# Patient Record
Sex: MALE | Race: BLACK OR AFRICAN AMERICAN | Employment: FULL TIME | URBAN - METROPOLITAN AREA
[De-identification: names, ages, dates, MRNs, and addresses within clinical notes are randomized per-mention and may not be internally consistent; named-entity substitution may affect disease eponyms.]

---

## 2022-09-07 RX ORDER — DEXAMETHASONE 4 MG/1
4 TABLET ORAL EVERY 6 HOURS
Status: ON HOLD | COMMUNITY
Start: 2022-09-01 | End: 2022-09-16 | Stop reason: HOSPADM

## 2022-09-07 RX ORDER — FEXOFENADINE HCL 180 MG/1
180 TABLET ORAL DAILY
Status: ON HOLD | COMMUNITY
End: 2022-09-14 | Stop reason: CLARIF

## 2022-09-07 RX ORDER — LATANOPROST 50 UG/ML
1 SOLUTION/ DROPS OPHTHALMIC NIGHTLY
COMMUNITY

## 2022-09-07 RX ORDER — ECHINACEA PURPUREA EXTRACT 125 MG
1 TABLET ORAL 2 TIMES DAILY
COMMUNITY

## 2022-09-07 RX ORDER — FERROUS SULFATE 325(65) MG
325 TABLET ORAL
COMMUNITY
Start: 2022-06-27

## 2022-09-07 RX ORDER — FAMOTIDINE 20 MG/1
20 TABLET, FILM COATED ORAL 2 TIMES DAILY
COMMUNITY
Start: 2022-09-01

## 2022-09-07 RX ORDER — AZELASTINE HYDROCHLORIDE, FLUTICASONE PROPIONATE 137; 50 UG/1; UG/1
1 SPRAY, METERED NASAL 2 TIMES DAILY
COMMUNITY

## 2022-09-07 RX ORDER — ACETAMINOPHEN 500 MG
1000 TABLET ORAL DAILY
COMMUNITY

## 2022-09-07 RX ORDER — HYDROCORTISONE 25 MG/ML
LOTION TOPICAL
Status: ON HOLD | COMMUNITY
Start: 2019-10-30 | End: 2022-09-14 | Stop reason: CLARIF

## 2022-09-07 RX ORDER — TRAVOPROST OPHTHALMIC SOLUTION 0.04 MG/ML
SOLUTION OPHTHALMIC
Status: ON HOLD | COMMUNITY
Start: 2009-10-07 | End: 2022-09-14 | Stop reason: CLARIF

## 2022-09-07 RX ORDER — CETIRIZINE HYDROCHLORIDE 10 MG/1
10 TABLET ORAL DAILY
COMMUNITY
Start: 2020-06-10

## 2022-09-07 RX ORDER — ERGOCALCIFEROL (VITAMIN D2) 1250 MCG
50000 CAPSULE ORAL WEEKLY
COMMUNITY

## 2022-09-07 NOTE — PROGRESS NOTES
PRE-OP INSTRUCTIONS FOR SURGICAL PATIENTS          Our Pre-admission Testing Nurses tried and were unable to reach you today. Please read the attached instructions if you did not listen to your voicemail. Follow all instructions provided to you from your surgeon's office, including your ARRIVAL TIME. Arrange for someone to drive you home and be with you for the first 24 hours after discharge. NOTE: at this time ONLY 2 ADULTS may accompany you and everyone must be masked. Enter the MAIN entrance located on [address] and report to the surgical desk on the LEFT side of the lobby. Please park in the parking garage or there is free MAD Incubator available after 7am for your use. Bring your insurance card & photo ID with you to register. Bring your medication list with you with dose and frequency listed (including over the counter medications)  Contact your ordering physician/surgeon for medication instructions as soon as possible, especially if taking blood thinners, aspirin, heart, or diabetic medication. Bariatric surgical patients need to call your surgeon if on diabetic medications (as some may need to be stopped 1-week preop)  A Pre-Surgical History and Physical MUST be completed WITHIN 30 DAYS OR LESS prior to your procedure by your Physician or an Urgent Care. DO NOT EAT ANYTHING 8 hours prior to arrival for surgery. You may have sips of WATER ONLY (up to 8 ounces) 4 hours prior to your arrival for surgery. Then nothing further 4 hours prior to arriving at hospital.   NOTE: ALL Gastric, Bariatric & Bowel surgery patients - you MUST follow your surgeon's instructions regarding eating/drinking as you will have very specific instructions to follow. If you did not receive these, call your surgeon's office immediately. No gum, candy, mints, or ice chips day of procedure. Please refrain from drinking alcohol the day before or day of your procedure.    Do not use any recreational marijuana at least 24 hours or street drugs (heroin, cocaine) at minimum 5 days prior to your procedure. Please do not smoke the day of your procedure. Dress in loose, comfortable clothing appropriate for redressing after your procedure. Do not wear jewelry (including body piercings), make-up, fingernail polish, lotion, powders, or metal hairclips. Contacts, glasses, dentures, and hearing aids will need to be removed prior to surgery. Bring your case(s) to protect them while you are in surgery. If you use a CPAP, please bring it with you on the day of your procedure. Do not shave or wax for 72 hours prior to procedure near your operative site. Leave all other valuables at home. IF there is a change in your physical condition for some reason, such as: a cold, fever, rash, cuts, sores, or any other infection, especially near your surgical site, contact your surgeon's office immediately. FOR WOMAN OF CHILDBEARING AGE ONLY- please make sure we can collect a urine sample upon arrival.     Instructions left on patient's voicemail.   Todd Davies RN.9/7/2022 .8:22 AM

## 2022-09-07 NOTE — PROGRESS NOTES
Place patient label inside box (if no patient label, complete below)  Name:  :  MR#:     Reji Winslow / PROCEDURE  I (we) Candace Arnold (Patient Name) authorize Crispin Butterfield MD (Provider / [facility]) and/or such assistants as may be selected by him/her, to perform the following operation/procedure(s): LEFT TEMPORAL CRANIOTOMY FOR RESECTION OF TUMOR WITH IMAGE GUIDANCE       Note: If unable to obtain consent prior to an emergent procedure, document the emergent reason in the medical record. This procedure has been explained to my (our) satisfaction and included in the explanation was: The intended benefit, nature, and extent of the procedure to be performed; The significant risks involved and the probability of success; Alternative procedures and methods of treatment; The dangers and probable consequences of such alternatives (including no procedure or treatment); The expected consequences of the procedure on my future health; Whether other qualified individuals would be performing important surgical tasks and/or whether  would be present to advise or support the procedure. I (we) understand that there are other risks of infection and other serious complications in the pre-operative/procedural and postoperative/procedural stages of my (our) care. I (we) have asked all of the questions which I (we) thought were important in deciding whether or not to undergo treatment or diagnosis. These questions have been answered to my (our) satisfaction. I (we) understand that no assurance can be given that the procedure will be a success, and no guarantee or warranty of success has been given to me (us).     It has been explained to me (us) that during the course of the operation/procedure, unforeseen conditions may be revealed that necessitate extension of the original procedure(s) or different procedure(s) than those set forth in Paragraph 1. I (we) authorize and request that the above-named physician, his/her assistants or his/her designees, perform procedures as necessary and desirable if deemed to be in my (our) best interest.     Revised 8/2/2021                                                                          Page 1 of 2       I acknowledge that health care personnel may be observing this procedure for the purpose of medical education or other specified purposes as may be necessary as requested and/or approved by my (our) physician. I (we) consent to the disposal by the hospital Pathologist of the removed tissue, parts or organs in accordance with hospital policy. I do ____ do not ____ consent to the use of a local infiltration pain blocking agent that will be used by my provider/surgical provider to help alleviate pain during my procedure. I do ____ do not ____ consent to an emergent blood transfusion in the case of a life-threatening situation that requires blood components to be administered. This consent is valid for 24 hours from the beginning of the procedure. This patient does ____ or does not ____ currently have a DNR status/order. If DNR order is in place, obtain Addendum to the Surgical Consent for ALL Patients with a DNR Order to address dustin-operative status for limited intervention or DNR suspension.      I have read and fully understand the above Consent for Operation/Procedure and that all blanks were completed before I signed the consent.   _____________________________       _____________________      ____/____am/pm  Signature of Patient or legal representative      Printed Name / Relationship            Date / Time   ____________________________       _____________________      ____/____am/pm  Witness to Signature                                    Printed Name                    Date / Time    If patient is unable to sign or is a minor, complete the following)  Patient is a minor, ____ years of age, or unable to sign because:   ______________________________________________________________________________________________    If a phone consent is obtained, consent will be documented by using two health care professionals, each affirming that the consenting party has no questions and gives consent for the procedure discussed with the physician/provider.   _____________________          ____________________       _____/_____am/pm   2nd witness to phone consent        Printed name           Date / Time    Informed Consent:  I have provided the explanation described above in section 1 to the patient and/or legal representative.  I have provided the patient and/or legal representative with an opportunity to ask any questions about the proposed operation/procedure.   ___________________________          ____________________         ____/____am/pm  Provider / Proceduralist                            Printed name            Date / Time  Revised 8/2/2021                                                                      Page 2 of 2

## 2022-09-12 ENCOUNTER — ANESTHESIA EVENT (OUTPATIENT)
Dept: OPERATING ROOM | Age: 62
DRG: 026 | End: 2022-09-12
Payer: COMMERCIAL

## 2022-09-13 ENCOUNTER — HOSPITAL ENCOUNTER (INPATIENT)
Age: 62
LOS: 3 days | Discharge: HOME HEALTH | DRG: 026 | End: 2022-09-16
Attending: NEUROLOGICAL SURGERY | Admitting: NEUROLOGICAL SURGERY
Payer: COMMERCIAL

## 2022-09-13 ENCOUNTER — ANESTHESIA (OUTPATIENT)
Dept: OPERATING ROOM | Age: 62
DRG: 026 | End: 2022-09-13
Payer: COMMERCIAL

## 2022-09-13 DIAGNOSIS — D49.6 BRAIN NEOPLASM (HCC): ICD-10-CM

## 2022-09-13 DIAGNOSIS — Z98.890 S/P CRANIOTOMY: Primary | ICD-10-CM

## 2022-09-13 LAB
ABO/RH: NORMAL
ANION GAP SERPL CALCULATED.3IONS-SCNC: 12 MMOL/L (ref 3–16)
ANTIBODY SCREEN: NORMAL
APTT: 23.9 SEC (ref 23–34.3)
BASOPHILS ABSOLUTE: 0 K/UL (ref 0–0.2)
BASOPHILS RELATIVE PERCENT: 0.1 %
BUN BLDV-MCNC: 10 MG/DL (ref 7–20)
CALCIUM SERPL-MCNC: 7.9 MG/DL (ref 8.3–10.6)
CHLORIDE BLD-SCNC: 101 MMOL/L (ref 99–110)
CO2: 25 MMOL/L (ref 21–32)
CREAT SERPL-MCNC: 0.9 MG/DL (ref 0.8–1.3)
EOSINOPHILS ABSOLUTE: 0 K/UL (ref 0–0.6)
EOSINOPHILS RELATIVE PERCENT: 0 %
GFR AFRICAN AMERICAN: >60
GFR NON-AFRICAN AMERICAN: >60
GLUCOSE BLD-MCNC: 143 MG/DL (ref 70–99)
HCT VFR BLD CALC: 39.6 % (ref 40.5–52.5)
HEMOGLOBIN: 13.3 G/DL (ref 13.5–17.5)
INR BLD: 0.99 (ref 0.87–1.14)
LYMPHOCYTES ABSOLUTE: 1 K/UL (ref 1–5.1)
LYMPHOCYTES RELATIVE PERCENT: 4.4 %
MCH RBC QN AUTO: 28.2 PG (ref 26–34)
MCHC RBC AUTO-ENTMCNC: 33.6 G/DL (ref 31–36)
MCV RBC AUTO: 83.7 FL (ref 80–100)
MONOCYTES ABSOLUTE: 1.9 K/UL (ref 0–1.3)
MONOCYTES RELATIVE PERCENT: 8.5 %
NEUTROPHILS ABSOLUTE: 19.1 K/UL (ref 1.7–7.7)
NEUTROPHILS RELATIVE PERCENT: 87 %
PDW BLD-RTO: 13.7 % (ref 12.4–15.4)
PLATELET # BLD: 205 K/UL (ref 135–450)
PMV BLD AUTO: 7.5 FL (ref 5–10.5)
POTASSIUM SERPL-SCNC: 3.7 MMOL/L (ref 3.5–5.1)
PROTHROMBIN TIME: 12.9 SEC (ref 11.7–14.5)
RBC # BLD: 4.73 M/UL (ref 4.2–5.9)
SODIUM BLD-SCNC: 138 MMOL/L (ref 136–145)
WBC # BLD: 22 K/UL (ref 4–11)

## 2022-09-13 PROCEDURE — 6360000002 HC RX W HCPCS: Performed by: NURSE ANESTHETIST, CERTIFIED REGISTERED

## 2022-09-13 PROCEDURE — 2580000003 HC RX 258: Performed by: NEUROLOGICAL SURGERY

## 2022-09-13 PROCEDURE — 2780000010 HC IMPLANT OTHER: Performed by: NEUROLOGICAL SURGERY

## 2022-09-13 PROCEDURE — 85025 COMPLETE CBC W/AUTO DIFF WBC: CPT

## 2022-09-13 PROCEDURE — 00B10ZZ EXCISION OF CEREBRAL MENINGES, OPEN APPROACH: ICD-10-PCS | Performed by: NEUROLOGICAL SURGERY

## 2022-09-13 PROCEDURE — 3600000014 HC SURGERY LEVEL 4 ADDTL 15MIN: Performed by: NEUROLOGICAL SURGERY

## 2022-09-13 PROCEDURE — 86850 RBC ANTIBODY SCREEN: CPT

## 2022-09-13 PROCEDURE — 85730 THROMBOPLASTIN TIME PARTIAL: CPT

## 2022-09-13 PROCEDURE — 86901 BLOOD TYPING SEROLOGIC RH(D): CPT

## 2022-09-13 PROCEDURE — 85610 PROTHROMBIN TIME: CPT

## 2022-09-13 PROCEDURE — 6360000002 HC RX W HCPCS: Performed by: NEUROLOGICAL SURGERY

## 2022-09-13 PROCEDURE — 2500000003 HC RX 250 WO HCPCS: Performed by: NURSE ANESTHETIST, CERTIFIED REGISTERED

## 2022-09-13 PROCEDURE — 80048 BASIC METABOLIC PNL TOTAL CA: CPT

## 2022-09-13 PROCEDURE — 2580000003 HC RX 258: Performed by: NURSE ANESTHETIST, CERTIFIED REGISTERED

## 2022-09-13 PROCEDURE — 3700000000 HC ANESTHESIA ATTENDED CARE: Performed by: NEUROLOGICAL SURGERY

## 2022-09-13 PROCEDURE — 36415 COLL VENOUS BLD VENIPUNCTURE: CPT

## 2022-09-13 PROCEDURE — 3600000004 HC SURGERY LEVEL 4 BASE: Performed by: NEUROLOGICAL SURGERY

## 2022-09-13 PROCEDURE — 2500000003 HC RX 250 WO HCPCS: Performed by: NEUROLOGICAL SURGERY

## 2022-09-13 PROCEDURE — 6370000000 HC RX 637 (ALT 250 FOR IP): Performed by: NEUROLOGICAL SURGERY

## 2022-09-13 PROCEDURE — 2000000000 HC ICU R&B

## 2022-09-13 PROCEDURE — 7100000001 HC PACU RECOVERY - ADDTL 15 MIN: Performed by: NEUROLOGICAL SURGERY

## 2022-09-13 PROCEDURE — 3700000001 HC ADD 15 MINUTES (ANESTHESIA): Performed by: NEUROLOGICAL SURGERY

## 2022-09-13 PROCEDURE — 2720000010 HC SURG SUPPLY STERILE: Performed by: NEUROLOGICAL SURGERY

## 2022-09-13 PROCEDURE — 7100000000 HC PACU RECOVERY - FIRST 15 MIN: Performed by: NEUROLOGICAL SURGERY

## 2022-09-13 PROCEDURE — 2709999900 HC NON-CHARGEABLE SUPPLY: Performed by: NEUROLOGICAL SURGERY

## 2022-09-13 PROCEDURE — 88300 SURGICAL PATH GROSS: CPT

## 2022-09-13 PROCEDURE — 86900 BLOOD TYPING SEROLOGIC ABO: CPT

## 2022-09-13 PROCEDURE — 2580000003 HC RX 258: Performed by: ANESTHESIOLOGY

## 2022-09-13 DEVICE — DURA 62105 SUBSTITUTE DUREPAIR 3X3IN NCE
Type: IMPLANTABLE DEVICE | Site: TEMPORAL LOBE | Status: FUNCTIONAL
Brand: DUREPAIR®

## 2022-09-13 RX ORDER — BACITRACIN ZINC AND POLYMYXIN B SULFATE 500; 1000 [USP'U]/G; [USP'U]/G
OINTMENT TOPICAL PRN
Status: DISCONTINUED | OUTPATIENT
Start: 2022-09-13 | End: 2022-09-13 | Stop reason: ALTCHOICE

## 2022-09-13 RX ORDER — OXYCODONE HYDROCHLORIDE 5 MG/1
5 TABLET ORAL PRN
Status: DISCONTINUED | OUTPATIENT
Start: 2022-09-13 | End: 2022-09-13 | Stop reason: HOSPADM

## 2022-09-13 RX ORDER — LIDOCAINE HYDROCHLORIDE 20 MG/ML
INJECTION, SOLUTION EPIDURAL; INFILTRATION; INTRACAUDAL; PERINEURAL PRN
Status: DISCONTINUED | OUTPATIENT
Start: 2022-09-13 | End: 2022-09-13 | Stop reason: SDUPTHER

## 2022-09-13 RX ORDER — SODIUM CHLORIDE, SODIUM LACTATE, POTASSIUM CHLORIDE, CALCIUM CHLORIDE 600; 310; 30; 20 MG/100ML; MG/100ML; MG/100ML; MG/100ML
INJECTION, SOLUTION INTRAVENOUS CONTINUOUS
Status: DISCONTINUED | OUTPATIENT
Start: 2022-09-13 | End: 2022-09-13 | Stop reason: HOSPADM

## 2022-09-13 RX ORDER — SODIUM CHLORIDE 0.9 % (FLUSH) 0.9 %
5-40 SYRINGE (ML) INJECTION EVERY 12 HOURS SCHEDULED
Status: DISCONTINUED | OUTPATIENT
Start: 2022-09-13 | End: 2022-09-13 | Stop reason: HOSPADM

## 2022-09-13 RX ORDER — PROCHLORPERAZINE EDISYLATE 5 MG/ML
5 INJECTION INTRAMUSCULAR; INTRAVENOUS
Status: DISCONTINUED | OUTPATIENT
Start: 2022-09-13 | End: 2022-09-13 | Stop reason: HOSPADM

## 2022-09-13 RX ORDER — ONDANSETRON 2 MG/ML
4 INJECTION INTRAMUSCULAR; INTRAVENOUS
Status: DISCONTINUED | OUTPATIENT
Start: 2022-09-13 | End: 2022-09-13 | Stop reason: HOSPADM

## 2022-09-13 RX ORDER — SODIUM CHLORIDE 0.9 % (FLUSH) 0.9 %
5-40 SYRINGE (ML) INJECTION PRN
Status: DISCONTINUED | OUTPATIENT
Start: 2022-09-13 | End: 2022-09-16 | Stop reason: HOSPADM

## 2022-09-13 RX ORDER — METHOCARBAMOL 750 MG/1
750 TABLET, FILM COATED ORAL 4 TIMES DAILY
Status: ON HOLD | COMMUNITY
End: 2022-09-14 | Stop reason: CLARIF

## 2022-09-13 RX ORDER — ACETAMINOPHEN 325 MG/1
650 TABLET ORAL EVERY 4 HOURS PRN
Status: DISCONTINUED | OUTPATIENT
Start: 2022-09-13 | End: 2022-09-16 | Stop reason: HOSPADM

## 2022-09-13 RX ORDER — HYDRALAZINE HYDROCHLORIDE 20 MG/ML
10 INJECTION INTRAMUSCULAR; INTRAVENOUS
Status: DISCONTINUED | OUTPATIENT
Start: 2022-09-13 | End: 2022-09-13 | Stop reason: HOSPADM

## 2022-09-13 RX ORDER — OXYCODONE HYDROCHLORIDE 5 MG/1
10 TABLET ORAL PRN
Status: DISCONTINUED | OUTPATIENT
Start: 2022-09-13 | End: 2022-09-13 | Stop reason: HOSPADM

## 2022-09-13 RX ORDER — HYDROMORPHONE HCL 110MG/55ML
PATIENT CONTROLLED ANALGESIA SYRINGE INTRAVENOUS PRN
Status: DISCONTINUED | OUTPATIENT
Start: 2022-09-13 | End: 2022-09-13 | Stop reason: SDUPTHER

## 2022-09-13 RX ORDER — ONDANSETRON 4 MG/1
4 TABLET, ORALLY DISINTEGRATING ORAL EVERY 8 HOURS PRN
Status: DISCONTINUED | OUTPATIENT
Start: 2022-09-13 | End: 2022-09-16 | Stop reason: HOSPADM

## 2022-09-13 RX ORDER — SODIUM CHLORIDE 9 MG/ML
INJECTION, SOLUTION INTRAVENOUS CONTINUOUS
Status: DISCONTINUED | OUTPATIENT
Start: 2022-09-13 | End: 2022-09-14

## 2022-09-13 RX ORDER — SUCCINYLCHOLINE/SOD CL,ISO/PF 100 MG/5ML
SYRINGE (ML) INTRAVENOUS PRN
Status: DISCONTINUED | OUTPATIENT
Start: 2022-09-13 | End: 2022-09-13 | Stop reason: SDUPTHER

## 2022-09-13 RX ORDER — OXYCODONE HYDROCHLORIDE 5 MG/1
10 TABLET ORAL EVERY 4 HOURS PRN
Status: DISCONTINUED | OUTPATIENT
Start: 2022-09-13 | End: 2022-09-16 | Stop reason: HOSPADM

## 2022-09-13 RX ORDER — MIDAZOLAM HYDROCHLORIDE 1 MG/ML
INJECTION INTRAMUSCULAR; INTRAVENOUS PRN
Status: DISCONTINUED | OUTPATIENT
Start: 2022-09-13 | End: 2022-09-13 | Stop reason: SDUPTHER

## 2022-09-13 RX ORDER — ONDANSETRON 2 MG/ML
INJECTION INTRAMUSCULAR; INTRAVENOUS PRN
Status: DISCONTINUED | OUTPATIENT
Start: 2022-09-13 | End: 2022-09-13 | Stop reason: SDUPTHER

## 2022-09-13 RX ORDER — DEXAMETHASONE 4 MG/1
4 TABLET ORAL EVERY 6 HOURS
Status: DISCONTINUED | OUTPATIENT
Start: 2022-09-13 | End: 2022-09-16 | Stop reason: HOSPADM

## 2022-09-13 RX ORDER — METHOCARBAMOL 750 MG/1
750 TABLET, FILM COATED ORAL EVERY 8 HOURS PRN
Status: DISCONTINUED | OUTPATIENT
Start: 2022-09-13 | End: 2022-09-14

## 2022-09-13 RX ORDER — MORPHINE SULFATE 4 MG/ML
4 INJECTION, SOLUTION INTRAMUSCULAR; INTRAVENOUS
Status: DISCONTINUED | OUTPATIENT
Start: 2022-09-13 | End: 2022-09-16 | Stop reason: HOSPADM

## 2022-09-13 RX ORDER — SODIUM CHLORIDE 0.9 % (FLUSH) 0.9 %
5-40 SYRINGE (ML) INJECTION PRN
Status: DISCONTINUED | OUTPATIENT
Start: 2022-09-13 | End: 2022-09-13 | Stop reason: HOSPADM

## 2022-09-13 RX ORDER — SODIUM CHLORIDE 0.9 % (FLUSH) 0.9 %
5-40 SYRINGE (ML) INJECTION EVERY 12 HOURS SCHEDULED
Status: DISCONTINUED | OUTPATIENT
Start: 2022-09-13 | End: 2022-09-16 | Stop reason: HOSPADM

## 2022-09-13 RX ORDER — LATANOPROST 50 UG/ML
1 SOLUTION/ DROPS OPHTHALMIC NIGHTLY
Status: DISCONTINUED | OUTPATIENT
Start: 2022-09-13 | End: 2022-09-16 | Stop reason: HOSPADM

## 2022-09-13 RX ORDER — FENTANYL CITRATE 50 UG/ML
INJECTION, SOLUTION INTRAMUSCULAR; INTRAVENOUS PRN
Status: DISCONTINUED | OUTPATIENT
Start: 2022-09-13 | End: 2022-09-13 | Stop reason: SDUPTHER

## 2022-09-13 RX ORDER — ROCURONIUM BROMIDE 10 MG/ML
INJECTION, SOLUTION INTRAVENOUS PRN
Status: DISCONTINUED | OUTPATIENT
Start: 2022-09-13 | End: 2022-09-13 | Stop reason: SDUPTHER

## 2022-09-13 RX ORDER — GLYCOPYRROLATE 1 MG/5 ML
SYRINGE (ML) INTRAVENOUS PRN
Status: DISCONTINUED | OUTPATIENT
Start: 2022-09-13 | End: 2022-09-13 | Stop reason: SDUPTHER

## 2022-09-13 RX ORDER — FERROUS SULFATE 325(65) MG
325 TABLET ORAL
Status: DISCONTINUED | OUTPATIENT
Start: 2022-09-14 | End: 2022-09-16 | Stop reason: HOSPADM

## 2022-09-13 RX ORDER — EPHEDRINE SULFATE 50 MG/ML
INJECTION INTRAVENOUS PRN
Status: DISCONTINUED | OUTPATIENT
Start: 2022-09-13 | End: 2022-09-13 | Stop reason: SDUPTHER

## 2022-09-13 RX ORDER — ERGOCALCIFEROL 1.25 MG/1
50000 CAPSULE ORAL WEEKLY
Status: DISCONTINUED | OUTPATIENT
Start: 2022-09-17 | End: 2022-09-16 | Stop reason: HOSPADM

## 2022-09-13 RX ORDER — DEXAMETHASONE SODIUM PHOSPHATE 4 MG/ML
INJECTION, SOLUTION INTRA-ARTICULAR; INTRALESIONAL; INTRAMUSCULAR; INTRAVENOUS; SOFT TISSUE PRN
Status: DISCONTINUED | OUTPATIENT
Start: 2022-09-13 | End: 2022-09-13 | Stop reason: SDUPTHER

## 2022-09-13 RX ORDER — PROPOFOL 10 MG/ML
INJECTION, EMULSION INTRAVENOUS PRN
Status: DISCONTINUED | OUTPATIENT
Start: 2022-09-13 | End: 2022-09-13 | Stop reason: SDUPTHER

## 2022-09-13 RX ORDER — LIDOCAINE HYDROCHLORIDE AND EPINEPHRINE 10; 10 MG/ML; UG/ML
INJECTION, SOLUTION INFILTRATION; PERINEURAL PRN
Status: DISCONTINUED | OUTPATIENT
Start: 2022-09-13 | End: 2022-09-13 | Stop reason: HOSPADM

## 2022-09-13 RX ORDER — SODIUM CHLORIDE 9 MG/ML
INJECTION, SOLUTION INTRAVENOUS PRN
Status: DISCONTINUED | OUTPATIENT
Start: 2022-09-13 | End: 2022-09-16 | Stop reason: HOSPADM

## 2022-09-13 RX ORDER — SODIUM CHLORIDE, SODIUM LACTATE, POTASSIUM CHLORIDE, CALCIUM CHLORIDE 600; 310; 30; 20 MG/100ML; MG/100ML; MG/100ML; MG/100ML
INJECTION, SOLUTION INTRAVENOUS CONTINUOUS PRN
Status: DISCONTINUED | OUTPATIENT
Start: 2022-09-13 | End: 2022-09-13 | Stop reason: SDUPTHER

## 2022-09-13 RX ORDER — LABETALOL HYDROCHLORIDE 5 MG/ML
10 INJECTION, SOLUTION INTRAVENOUS
Status: DISCONTINUED | OUTPATIENT
Start: 2022-09-13 | End: 2022-09-13 | Stop reason: HOSPADM

## 2022-09-13 RX ORDER — SODIUM CHLORIDE, SODIUM LACTATE, POTASSIUM CHLORIDE, AND CALCIUM CHLORIDE .6; .31; .03; .02 G/100ML; G/100ML; G/100ML; G/100ML
IRRIGANT IRRIGATION PRN
Status: DISCONTINUED | OUTPATIENT
Start: 2022-09-13 | End: 2022-09-13 | Stop reason: HOSPADM

## 2022-09-13 RX ORDER — SODIUM CHLORIDE 9 MG/ML
INJECTION, SOLUTION INTRAVENOUS PRN
Status: DISCONTINUED | OUTPATIENT
Start: 2022-09-13 | End: 2022-09-13 | Stop reason: HOSPADM

## 2022-09-13 RX ORDER — ACETAMINOPHEN 500 MG
1000 TABLET ORAL DAILY
Status: DISCONTINUED | OUTPATIENT
Start: 2022-09-13 | End: 2022-09-16 | Stop reason: HOSPADM

## 2022-09-13 RX ORDER — ONDANSETRON 2 MG/ML
4 INJECTION INTRAMUSCULAR; INTRAVENOUS EVERY 6 HOURS PRN
Status: DISCONTINUED | OUTPATIENT
Start: 2022-09-13 | End: 2022-09-16 | Stop reason: HOSPADM

## 2022-09-13 RX ORDER — OXYCODONE HYDROCHLORIDE 5 MG/1
5 TABLET ORAL EVERY 4 HOURS PRN
Status: DISCONTINUED | OUTPATIENT
Start: 2022-09-13 | End: 2022-09-16 | Stop reason: HOSPADM

## 2022-09-13 RX ORDER — FAMOTIDINE 20 MG/1
20 TABLET, FILM COATED ORAL 2 TIMES DAILY
Status: DISCONTINUED | OUTPATIENT
Start: 2022-09-13 | End: 2022-09-14

## 2022-09-13 RX ORDER — MORPHINE SULFATE 2 MG/ML
2 INJECTION, SOLUTION INTRAMUSCULAR; INTRAVENOUS
Status: DISCONTINUED | OUTPATIENT
Start: 2022-09-13 | End: 2022-09-16 | Stop reason: HOSPADM

## 2022-09-13 RX ADMIN — FAMOTIDINE 20 MG: 20 TABLET ORAL at 21:18

## 2022-09-13 RX ADMIN — SODIUM CHLORIDE: 9 INJECTION, SOLUTION INTRAVENOUS at 18:54

## 2022-09-13 RX ADMIN — HYDROMORPHONE HYDROCHLORIDE 0.5 MG: 2 INJECTION, SOLUTION INTRAMUSCULAR; INTRAVENOUS; SUBCUTANEOUS at 17:52

## 2022-09-13 RX ADMIN — PHENYLEPHRINE HYDROCHLORIDE 200 MCG: 10 INJECTION, SOLUTION INTRAMUSCULAR; INTRAVENOUS; SUBCUTANEOUS at 13:25

## 2022-09-13 RX ADMIN — HYDROMORPHONE HYDROCHLORIDE 1 MG: 2 INJECTION, SOLUTION INTRAMUSCULAR; INTRAVENOUS; SUBCUTANEOUS at 13:20

## 2022-09-13 RX ADMIN — ROCURONIUM BROMIDE 10 MG: 10 INJECTION INTRAVENOUS at 16:55

## 2022-09-13 RX ADMIN — ROCURONIUM BROMIDE 10 MG: 10 INJECTION INTRAVENOUS at 11:15

## 2022-09-13 RX ADMIN — PHENYLEPHRINE HYDROCHLORIDE 100 MCG: 10 INJECTION, SOLUTION INTRAMUSCULAR; INTRAVENOUS; SUBCUTANEOUS at 15:36

## 2022-09-13 RX ADMIN — ROCURONIUM BROMIDE 20 MG: 10 INJECTION INTRAVENOUS at 12:45

## 2022-09-13 RX ADMIN — SUGAMMADEX 200 MG: 100 INJECTION, SOLUTION INTRAVENOUS at 17:42

## 2022-09-13 RX ADMIN — ROCURONIUM BROMIDE 10 MG: 10 INJECTION INTRAVENOUS at 15:06

## 2022-09-13 RX ADMIN — PHENYLEPHRINE HYDROCHLORIDE 100 MCG: 10 INJECTION, SOLUTION INTRAMUSCULAR; INTRAVENOUS; SUBCUTANEOUS at 15:15

## 2022-09-13 RX ADMIN — PHENYLEPHRINE HYDROCHLORIDE 100 MCG: 10 INJECTION, SOLUTION INTRAMUSCULAR; INTRAVENOUS; SUBCUTANEOUS at 16:27

## 2022-09-13 RX ADMIN — FENTANYL CITRATE 50 MCG: 50 INJECTION, SOLUTION INTRAMUSCULAR; INTRAVENOUS at 12:35

## 2022-09-13 RX ADMIN — ONDANSETRON 4 MG: 2 INJECTION INTRAMUSCULAR; INTRAVENOUS at 16:46

## 2022-09-13 RX ADMIN — Medication 0.3 MG: at 11:15

## 2022-09-13 RX ADMIN — ONDANSETRON 4 MG: 2 INJECTION INTRAMUSCULAR; INTRAVENOUS at 11:25

## 2022-09-13 RX ADMIN — PROPOFOL 30 MG: 10 INJECTION, EMULSION INTRAVENOUS at 17:51

## 2022-09-13 RX ADMIN — SODIUM CHLORIDE, PRESERVATIVE FREE 10 ML: 5 INJECTION INTRAVENOUS at 21:19

## 2022-09-13 RX ADMIN — ROCURONIUM BROMIDE 20 MG: 10 INJECTION INTRAVENOUS at 12:00

## 2022-09-13 RX ADMIN — DEXAMETHASONE SODIUM PHOSPHATE 8 MG: 4 INJECTION, SOLUTION INTRAMUSCULAR; INTRAVENOUS at 11:25

## 2022-09-13 RX ADMIN — SODIUM CHLORIDE, POTASSIUM CHLORIDE, SODIUM LACTATE AND CALCIUM CHLORIDE: 600; 310; 30; 20 INJECTION, SOLUTION INTRAVENOUS at 10:02

## 2022-09-13 RX ADMIN — PROPOFOL 200 MG: 10 INJECTION, EMULSION INTRAVENOUS at 11:16

## 2022-09-13 RX ADMIN — SODIUM CHLORIDE, SODIUM LACTATE, POTASSIUM CHLORIDE, AND CALCIUM CHLORIDE: .6; .31; .03; .02 INJECTION, SOLUTION INTRAVENOUS at 15:44

## 2022-09-13 RX ADMIN — FENTANYL CITRATE 50 MCG: 50 INJECTION, SOLUTION INTRAMUSCULAR; INTRAVENOUS at 12:45

## 2022-09-13 RX ADMIN — CEFAZOLIN 2000 MG: 2 INJECTION, POWDER, FOR SOLUTION INTRAMUSCULAR; INTRAVENOUS at 11:18

## 2022-09-13 RX ADMIN — HYDROMORPHONE HYDROCHLORIDE 0.5 MG: 2 INJECTION, SOLUTION INTRAMUSCULAR; INTRAVENOUS; SUBCUTANEOUS at 17:44

## 2022-09-13 RX ADMIN — Medication 160 MG: at 11:16

## 2022-09-13 RX ADMIN — PHENYLEPHRINE HYDROCHLORIDE 200 MCG: 10 INJECTION, SOLUTION INTRAMUSCULAR; INTRAVENOUS; SUBCUTANEOUS at 13:40

## 2022-09-13 RX ADMIN — EPHEDRINE SULFATE 10 MG: 50 INJECTION INTRAVENOUS at 15:48

## 2022-09-13 RX ADMIN — PHENYLEPHRINE HYDROCHLORIDE 100 MCG: 10 INJECTION, SOLUTION INTRAMUSCULAR; INTRAVENOUS; SUBCUTANEOUS at 14:57

## 2022-09-13 RX ADMIN — PHENYLEPHRINE HYDROCHLORIDE 30 MCG/MIN: 10 INJECTION, SOLUTION INTRAMUSCULAR; INTRAVENOUS; SUBCUTANEOUS at 11:49

## 2022-09-13 RX ADMIN — PHENYLEPHRINE HYDROCHLORIDE 200 MCG: 10 INJECTION, SOLUTION INTRAMUSCULAR; INTRAVENOUS; SUBCUTANEOUS at 13:29

## 2022-09-13 RX ADMIN — ROCURONIUM BROMIDE 40 MG: 10 INJECTION INTRAVENOUS at 11:24

## 2022-09-13 RX ADMIN — Medication 0.2 MG: at 13:09

## 2022-09-13 RX ADMIN — SODIUM CHLORIDE, SODIUM LACTATE, POTASSIUM CHLORIDE, AND CALCIUM CHLORIDE: .6; .31; .03; .02 INJECTION, SOLUTION INTRAVENOUS at 17:42

## 2022-09-13 RX ADMIN — LIDOCAINE HYDROCHLORIDE 3 ML: 20 INJECTION, SOLUTION EPIDURAL; INFILTRATION; INTRACAUDAL; PERINEURAL at 11:16

## 2022-09-13 RX ADMIN — MIDAZOLAM HYDROCHLORIDE 2 MG: 2 INJECTION, SOLUTION INTRAMUSCULAR; INTRAVENOUS at 11:12

## 2022-09-13 RX ADMIN — DEXAMETHASONE 4 MG: 4 TABLET ORAL at 21:18

## 2022-09-13 RX ADMIN — PHENYLEPHRINE HYDROCHLORIDE 100 MCG: 10 INJECTION, SOLUTION INTRAMUSCULAR; INTRAVENOUS; SUBCUTANEOUS at 14:52

## 2022-09-13 RX ADMIN — ROCURONIUM BROMIDE 20 MG: 10 INJECTION INTRAVENOUS at 15:56

## 2022-09-13 RX ADMIN — METHOCARBAMOL 1000 MG: 100 INJECTION INTRAMUSCULAR; INTRAVENOUS at 18:17

## 2022-09-13 RX ADMIN — CEFAZOLIN 2000 MG: 2 INJECTION, POWDER, FOR SOLUTION INTRAMUSCULAR; INTRAVENOUS at 15:20

## 2022-09-13 RX ADMIN — EPHEDRINE SULFATE 10 MG: 50 INJECTION INTRAVENOUS at 15:57

## 2022-09-13 RX ADMIN — PHENYLEPHRINE HYDROCHLORIDE 50 MCG: 10 INJECTION, SOLUTION INTRAMUSCULAR; INTRAVENOUS; SUBCUTANEOUS at 15:28

## 2022-09-13 RX ADMIN — Medication 0.2 MG: at 14:35

## 2022-09-13 RX ADMIN — ROCURONIUM BROMIDE 20 MG: 10 INJECTION INTRAVENOUS at 13:31

## 2022-09-13 RX ADMIN — BISACODYL 5 MG: 5 TABLET, COATED ORAL at 21:18

## 2022-09-13 RX ADMIN — SODIUM CHLORIDE, SODIUM LACTATE, POTASSIUM CHLORIDE, AND CALCIUM CHLORIDE: .6; .31; .03; .02 INJECTION, SOLUTION INTRAVENOUS at 11:10

## 2022-09-13 RX ADMIN — PHENYLEPHRINE HYDROCHLORIDE 100 MCG: 10 INJECTION, SOLUTION INTRAMUSCULAR; INTRAVENOUS; SUBCUTANEOUS at 11:42

## 2022-09-13 RX ADMIN — PHENYLEPHRINE HYDROCHLORIDE 100 MCG: 10 INJECTION, SOLUTION INTRAMUSCULAR; INTRAVENOUS; SUBCUTANEOUS at 16:24

## 2022-09-13 RX ADMIN — ROCURONIUM BROMIDE 20 MG: 10 INJECTION INTRAVENOUS at 14:34

## 2022-09-13 ASSESSMENT — PAIN SCALES - GENERAL
PAINLEVEL_OUTOF10: 0
PAINLEVEL_OUTOF10: 0
PAINLEVEL_OUTOF10: 1
PAINLEVEL_OUTOF10: 0

## 2022-09-13 ASSESSMENT — PAIN DESCRIPTION - PAIN TYPE: TYPE: SURGICAL PAIN

## 2022-09-13 ASSESSMENT — PAIN - FUNCTIONAL ASSESSMENT
PAIN_FUNCTIONAL_ASSESSMENT: 0-10
PAIN_FUNCTIONAL_ASSESSMENT: ACTIVITIES ARE NOT PREVENTED

## 2022-09-13 ASSESSMENT — PAIN DESCRIPTION - ORIENTATION: ORIENTATION: LEFT

## 2022-09-13 ASSESSMENT — PAIN DESCRIPTION - LOCATION: LOCATION: HEAD

## 2022-09-13 NOTE — H&P
Eleno Abrams    [id number]    [facility] H & P  Department of General Surgery   Surgical Service   Pre-operative History and Physical  Last H & P within the last 30 days. DIAGNOSIS:   Brain neoplasm ([address] [D49.6]    Procedure(s):  LEFT TEMPORAL CRANIOTOMY FOR RESECTION OF TUMOR WITH IMAGE GUIDANCE    History obtained from: Patient interview and EHR      HISTORY OF PRESENT ILLNESS:   The patient is a 63 y.o. male with altered mental status was found to have brain mass on workup which is consistent with meningioma. They presents today for the above procedure. Illness Screening: Patient denies fever, chills, worsening cough, or close contact with sick individuals. Past Medical History:        Diagnosis Date    Brain mass     left temporal measuring 4.2 cm, meningioma    Cancer (HCC)     Flashbacks (HCC)     Glaucoma     Hypertension     Hypokalemia     Kidney anomaly, congenital     congenital solitary kidney    Lumbar spinal stenosis     Memory deficit     Obesity     Osteoarthritis     bilat knees    Prediabetes     Sinus problem     chronic sinusitis     Past Surgical History:        Procedure Laterality Date    COLONOSCOPY      2020 x2    SINUS SURGERY      cyst removed    TONSILLECTOMY AND ADENOIDECTOMY      childhood       Medications Prior to Admission:      Prior to Admission medications    Medication Sig Start Date End Date Taking?  Authorizing Provider   methocarbamol (ROBAXIN) 750 MG tablet Take 750 mg by mouth 4 times daily   Yes Historical Provider, MD   Azelastine-Fluticasone 137-50 MCG/ACT SUSP 1 puff each nostril twice daily 12/2/17  Yes Historical Provider, MD   cetirizine (ZYRTEC) 10 MG tablet Take 10 mg by mouth daily 6/10/20  Yes Historical Provider, MD   Cyanocobalamin ER 1000 MCG TBCR Take 1 each by mouth daily 5/16/22  Yes Historical Provider, MD   dexamethasone (DECADRON) 4 MG tablet Take 4 mg by mouth in the morning and 4 mg at noon and 4 mg in the evening and 4 mg before bedtime. 9/1/22 9/16/22 Yes Historical Provider, MD   ergocalciferol (ERGOCALCIFEROL) 1.25 MG (98261 UT) capsule Take 50,000 Units by mouth once a week 4/4/22  Yes Historical Provider, MD   famotidine (PEPCID) 20 MG tablet Take 20 mg by mouth 2 times daily 9/1/22  Yes Historical Provider, MD   ferrous sulfate (IRON 325) 325 (65 Fe) MG tablet Take 325 mg by mouth daily (with breakfast) 6/27/22  Yes Historical Provider, MD   hydrocortisone (HYTONE) 2.5 % lotion Apply to rash or itching skin on scalp or face 1 or 2 times daily as directed. 10/30/19  Yes Historical Provider, MD   latanoprost (XALATAN) 0.005 % ophthalmic solution Apply 1 drop to eye nightly 10/17/17  Yes Historical Provider, MD   sodium chloride (OCEAN) 0.65 % nasal spray SPRAY TWO SPRAYS OF SALINE ON EACH NARE 5 TIMES A DAY 7/25/22  Yes Historical Provider, MD   Travoprost, MARA Free, (TRAVATAN Z) 0.004 % SOLN ophthalmic solution Apply to eye 10/7/09  Yes Historical Provider, MD   acetaminophen (TYLENOL) 500 MG tablet Take 1,000 mg by mouth daily    Historical Provider, MD   fexofenadine (ALLEGRA) 180 MG tablet Take 180 mg by mouth daily    Historical Provider, MD         Allergies:  Patient has no known allergies. PHYSICAL EXAM:      BP (!) 133/90   Pulse 60   Temp 97.8 °F (36.6 °C) (Temporal)   Resp 16   Ht 6' (1.829 m)   Wt 279 lb 9.6 oz (126.8 kg)   SpO2 97%   BMI 37.92 kg/m²      Airway:  Airway patent with no audible stridor    Heart:  Regular rate and rhythm, No murmur noted    Lungs:  No increased work of breathing, good air exchange, clear to auscultation bilaterally, no crackles or wheezing    Abdomen:  Soft, non-distended, non-tender, no rebound tenderness or guarding, and no masses palpated    ASSESSMENT AND PLAN     Patient is a 63 y.o. male with above specified procedure planned. 1.  The patients history and physical was obtained and signed off by the pre-admission testing department.   Patient seen and focused exam done today- no new changes since last physical exam on 8/30/22    2. Access to ancillary services are available per request of the provider.     ROSLYN Scott - CNP     9/13/2022

## 2022-09-13 NOTE — PROGRESS NOTES
Patient admitted to PACU bed 8 from OR via bed s/p LEFT TEMPORAL CRANIOTOMY FOR RESECTION OF TUMOR WITH IMAGE GUIDANCE - Left. Report received at bedside from CRNA and OR staff at 1806. Pt was reported with some hypotension which treatment was given. No complications reported. Pt connected to PACU monitoring equipment, IVF infusing, no pain noted. Patient arrived still sedated from anesthesia with oral airway in that was subsequently removed during report. Placed on O2 @ 2L NC breathing easy and unlabored. Surgical incision to L head covered with dressing C/D/I. Will continue to monitor.

## 2022-09-13 NOTE — PROGRESS NOTES
Dr. Tisha Paige at the bedside assessed pt and made aware of some weakness on the R side, always a possibility per MD. MRI to be done tomorrow AM 9/14/22 per MD.

## 2022-09-14 ENCOUNTER — APPOINTMENT (OUTPATIENT)
Dept: MRI IMAGING | Age: 62
DRG: 026 | End: 2022-09-14
Attending: NEUROLOGICAL SURGERY
Payer: COMMERCIAL

## 2022-09-14 LAB
ANION GAP SERPL CALCULATED.3IONS-SCNC: 10 MMOL/L (ref 3–16)
ANION GAP SERPL CALCULATED.3IONS-SCNC: 7 MMOL/L (ref 3–16)
BASOPHILS ABSOLUTE: 0 K/UL (ref 0–0.2)
BASOPHILS RELATIVE PERCENT: 0 %
BUN BLDV-MCNC: 10 MG/DL (ref 7–20)
BUN BLDV-MCNC: 9 MG/DL (ref 7–20)
CALCIUM SERPL-MCNC: 7.4 MG/DL (ref 8.3–10.6)
CALCIUM SERPL-MCNC: 8 MG/DL (ref 8.3–10.6)
CHLORIDE BLD-SCNC: 100 MMOL/L (ref 99–110)
CHLORIDE BLD-SCNC: 99 MMOL/L (ref 99–110)
CO2: 27 MMOL/L (ref 21–32)
CO2: 30 MMOL/L (ref 21–32)
CREAT SERPL-MCNC: 0.8 MG/DL (ref 0.8–1.3)
CREAT SERPL-MCNC: 0.8 MG/DL (ref 0.8–1.3)
EOSINOPHILS ABSOLUTE: 0 K/UL (ref 0–0.6)
EOSINOPHILS RELATIVE PERCENT: 0 %
GFR AFRICAN AMERICAN: >60
GFR AFRICAN AMERICAN: >60
GFR NON-AFRICAN AMERICAN: >60
GFR NON-AFRICAN AMERICAN: >60
GLUCOSE BLD-MCNC: 128 MG/DL (ref 70–99)
GLUCOSE BLD-MCNC: 129 MG/DL (ref 70–99)
GLUCOSE BLD-MCNC: 153 MG/DL (ref 70–99)
GLUCOSE BLD-MCNC: 161 MG/DL (ref 70–99)
HCT VFR BLD CALC: 40.2 % (ref 40.5–52.5)
HEMOGLOBIN: 13.3 G/DL (ref 13.5–17.5)
LYMPHOCYTES ABSOLUTE: 0.3 K/UL (ref 1–5.1)
LYMPHOCYTES RELATIVE PERCENT: 1 %
MCH RBC QN AUTO: 28 PG (ref 26–34)
MCHC RBC AUTO-ENTMCNC: 33 G/DL (ref 31–36)
MCV RBC AUTO: 84.9 FL (ref 80–100)
MONOCYTES ABSOLUTE: 3.5 K/UL (ref 0–1.3)
MONOCYTES RELATIVE PERCENT: 13 %
NEUTROPHILS ABSOLUTE: 23.2 K/UL (ref 1.7–7.7)
NEUTROPHILS RELATIVE PERCENT: 86 %
PDW BLD-RTO: 13.9 % (ref 12.4–15.4)
PERFORMED ON: ABNORMAL
PERFORMED ON: ABNORMAL
PLATELET # BLD: 197 K/UL (ref 135–450)
PMV BLD AUTO: 7.5 FL (ref 5–10.5)
POTASSIUM REFLEX MAGNESIUM: 3.6 MMOL/L (ref 3.5–5.1)
POTASSIUM REFLEX MAGNESIUM: 8.2 MMOL/L (ref 3.5–5.1)
RBC # BLD: 4.74 M/UL (ref 4.2–5.9)
SODIUM BLD-SCNC: 133 MMOL/L (ref 136–145)
SODIUM BLD-SCNC: 140 MMOL/L (ref 136–145)
STOMATOCYTES: ABNORMAL
WBC # BLD: 27 K/UL (ref 4–11)

## 2022-09-14 PROCEDURE — 6370000000 HC RX 637 (ALT 250 FOR IP): Performed by: NURSE PRACTITIONER

## 2022-09-14 PROCEDURE — 80048 BASIC METABOLIC PNL TOTAL CA: CPT

## 2022-09-14 PROCEDURE — 6360000002 HC RX W HCPCS: Performed by: NURSE PRACTITIONER

## 2022-09-14 PROCEDURE — 70553 MRI BRAIN STEM W/O & W/DYE: CPT

## 2022-09-14 PROCEDURE — 2580000003 HC RX 258: Performed by: NEUROLOGICAL SURGERY

## 2022-09-14 PROCEDURE — 85025 COMPLETE CBC W/AUTO DIFF WBC: CPT

## 2022-09-14 PROCEDURE — 6360000002 HC RX W HCPCS: Performed by: NEUROLOGICAL SURGERY

## 2022-09-14 PROCEDURE — 6360000004 HC RX CONTRAST MEDICATION: Performed by: NEUROLOGICAL SURGERY

## 2022-09-14 PROCEDURE — 2580000003 HC RX 258: Performed by: NURSE PRACTITIONER

## 2022-09-14 PROCEDURE — 2060000000 HC ICU INTERMEDIATE R&B

## 2022-09-14 PROCEDURE — 36415 COLL VENOUS BLD VENIPUNCTURE: CPT

## 2022-09-14 PROCEDURE — 6370000000 HC RX 637 (ALT 250 FOR IP): Performed by: NEUROLOGICAL SURGERY

## 2022-09-14 PROCEDURE — A9576 INJ PROHANCE MULTIPACK: HCPCS | Performed by: NEUROLOGICAL SURGERY

## 2022-09-14 PROCEDURE — APPNB30 APP NON BILLABLE TIME 0-30 MINS: Performed by: NURSE PRACTITIONER

## 2022-09-14 RX ORDER — SPIRONOLACTONE 50 MG/1
50 TABLET, FILM COATED ORAL DAILY
COMMUNITY

## 2022-09-14 RX ORDER — LOSARTAN POTASSIUM 25 MG/1
25 TABLET ORAL DAILY
COMMUNITY

## 2022-09-14 RX ORDER — METHOCARBAMOL 750 MG/1
750 TABLET, FILM COATED ORAL EVERY 6 HOURS
Status: DISCONTINUED | OUTPATIENT
Start: 2022-09-15 | End: 2022-09-16 | Stop reason: HOSPADM

## 2022-09-14 RX ORDER — PANTOPRAZOLE SODIUM 40 MG/1
40 TABLET, DELAYED RELEASE ORAL
Status: DISCONTINUED | OUTPATIENT
Start: 2022-09-15 | End: 2022-09-16 | Stop reason: HOSPADM

## 2022-09-14 RX ORDER — LANOLIN ALCOHOL/MO/W.PET/CERES
3 CREAM (GRAM) TOPICAL NIGHTLY PRN
Status: DISCONTINUED | OUTPATIENT
Start: 2022-09-14 | End: 2022-09-16 | Stop reason: HOSPADM

## 2022-09-14 RX ORDER — CHOLECALCIFEROL (VITAMIN D3) 125 MCG
5 CAPSULE ORAL NIGHTLY PRN
COMMUNITY

## 2022-09-14 RX ORDER — SPIRONOLACTONE 25 MG/1
25 TABLET ORAL DAILY
Status: DISCONTINUED | OUTPATIENT
Start: 2022-09-14 | End: 2022-09-16 | Stop reason: HOSPADM

## 2022-09-14 RX ORDER — HEPARIN SODIUM 5000 [USP'U]/ML
5000 INJECTION, SOLUTION INTRAVENOUS; SUBCUTANEOUS EVERY 8 HOURS SCHEDULED
Status: DISCONTINUED | OUTPATIENT
Start: 2022-09-14 | End: 2022-09-16 | Stop reason: HOSPADM

## 2022-09-14 RX ORDER — LOSARTAN POTASSIUM 25 MG/1
25 TABLET ORAL DAILY
Status: DISCONTINUED | OUTPATIENT
Start: 2022-09-14 | End: 2022-09-16 | Stop reason: HOSPADM

## 2022-09-14 RX ORDER — LEVETIRACETAM 500 MG/1
500 TABLET ORAL 2 TIMES DAILY
Status: DISCONTINUED | OUTPATIENT
Start: 2022-09-14 | End: 2022-09-16 | Stop reason: HOSPADM

## 2022-09-14 RX ORDER — POTASSIUM CHLORIDE 20 MEQ/1
60 TABLET, EXTENDED RELEASE ORAL
Status: DISCONTINUED | OUTPATIENT
Start: 2022-09-15 | End: 2022-09-16 | Stop reason: HOSPADM

## 2022-09-14 RX ORDER — POTASSIUM CHLORIDE 1.5 G/1.77G
60 POWDER, FOR SOLUTION ORAL DAILY
COMMUNITY

## 2022-09-14 RX ORDER — SENNA AND DOCUSATE SODIUM 50; 8.6 MG/1; MG/1
2 TABLET, FILM COATED ORAL DAILY PRN
Status: DISCONTINUED | OUTPATIENT
Start: 2022-09-14 | End: 2022-09-16 | Stop reason: HOSPADM

## 2022-09-14 RX ORDER — POLYETHYLENE GLYCOL 3350 17 G/17G
17 POWDER, FOR SOLUTION ORAL DAILY
Status: DISCONTINUED | OUTPATIENT
Start: 2022-09-14 | End: 2022-09-16 | Stop reason: HOSPADM

## 2022-09-14 RX ADMIN — DEXAMETHASONE 4 MG: 4 TABLET ORAL at 13:45

## 2022-09-14 RX ADMIN — FERROUS SULFATE TAB 325 MG (65 MG ELEMENTAL FE) 325 MG: 325 (65 FE) TAB at 09:08

## 2022-09-14 RX ADMIN — SODIUM CHLORIDE, PRESERVATIVE FREE 10 ML: 5 INJECTION INTRAVENOUS at 10:26

## 2022-09-14 RX ADMIN — Medication 3 MG: at 20:22

## 2022-09-14 RX ADMIN — METHOCARBAMOL 1000 MG: 100 INJECTION, SOLUTION INTRAMUSCULAR; INTRAVENOUS at 20:23

## 2022-09-14 RX ADMIN — HEPARIN SODIUM 5000 UNITS: 5000 INJECTION INTRAVENOUS; SUBCUTANEOUS at 20:21

## 2022-09-14 RX ADMIN — LOSARTAN POTASSIUM 25 MG: 25 TABLET, FILM COATED ORAL at 14:53

## 2022-09-14 RX ADMIN — SPIRONOLACTONE 25 MG: 25 TABLET, FILM COATED ORAL at 14:53

## 2022-09-14 RX ADMIN — SENNOSIDES AND DOCUSATE SODIUM 2 TABLET: 50; 8.6 TABLET ORAL at 11:21

## 2022-09-14 RX ADMIN — LATANOPROST 1 DROP: 50 SOLUTION OPHTHALMIC at 20:21

## 2022-09-14 RX ADMIN — HEPARIN SODIUM 5000 UNITS: 5000 INJECTION INTRAVENOUS; SUBCUTANEOUS at 13:44

## 2022-09-14 RX ADMIN — GADOTERIDOL 20 ML: 279.3 INJECTION, SOLUTION INTRAVENOUS at 19:41

## 2022-09-14 RX ADMIN — FAMOTIDINE 20 MG: 20 TABLET ORAL at 09:06

## 2022-09-14 RX ADMIN — DEXAMETHASONE 4 MG: 4 TABLET ORAL at 20:21

## 2022-09-14 RX ADMIN — METHOCARBAMOL 1000 MG: 100 INJECTION, SOLUTION INTRAMUSCULAR; INTRAVENOUS at 11:55

## 2022-09-14 RX ADMIN — BISACODYL 5 MG: 5 TABLET, COATED ORAL at 09:06

## 2022-09-14 RX ADMIN — ACETAMINOPHEN 1000 MG: 500 TABLET ORAL at 09:05

## 2022-09-14 RX ADMIN — DEXAMETHASONE 4 MG: 4 TABLET ORAL at 09:06

## 2022-09-14 RX ADMIN — LEVETIRACETAM 500 MG: 500 TABLET, FILM COATED ORAL at 20:21

## 2022-09-14 RX ADMIN — POLYETHYLENE GLYCOL 3350 17 G: 17 POWDER, FOR SOLUTION ORAL at 11:25

## 2022-09-14 RX ADMIN — DEXAMETHASONE 4 MG: 4 TABLET ORAL at 03:11

## 2022-09-14 RX ADMIN — SODIUM CHLORIDE, PRESERVATIVE FREE 10 ML: 5 INJECTION INTRAVENOUS at 20:28

## 2022-09-14 RX ADMIN — LEVETIRACETAM 500 MG: 500 TABLET, FILM COATED ORAL at 11:21

## 2022-09-14 ASSESSMENT — PAIN SCALES - GENERAL
PAINLEVEL_OUTOF10: 0
PAINLEVEL_OUTOF10: 1
PAINLEVEL_OUTOF10: 0
PAINLEVEL_OUTOF10: 2

## 2022-09-14 NOTE — PROGRESS NOTES
NEUROSURGERY POST-OP PROGRESS NOTE    Patient Name: Cody Leal YOB: 1960   Sex: Male Age: 63 yrs     Medical Record Number: [id number] Acct Number: [de-identified]   Room Number: 7140/0781-21 Hospital Day: Hospital Day: 2     Interval History:  Post-operative Day# 1 s/p Procedure(s) (LRB):  LEFT TEMPORAL CRANIOTOMY FOR RESECTION OF TUMOR WITH IMAGE GUIDANCE (Left)    Subjective: Pt has level 1 of pain, no other c/o    Objective:    VITAL SIGNS   BP (!) 138/94   Pulse 65   Temp 98.4 °F (36.9 °C) (Oral)   Resp 15   Ht 6' (1.829 m)   Wt 279 lb 9.6 oz (126.8 kg)   SpO2 98%   BMI 37.92 kg/m²    Height Height: 6' (182.9 cm)   Weight Weight: 279 lb 9.6 oz (126.8 kg)        Allergies No Known Allergies   NPO Status ADULT DIET;  Regular   Isolation No active isolations     LABS   Basic Metabolic Profile Recent Labs     09/13/22 2113 09/14/22  0513 09/14/22  0628    133* 140    99 100   CO2 25 27 30   BUN 10 9 10   CREATININE 0.9 0.8 0.8   GLUCOSE 143* 129* 128*      Complete Blood Count Recent Labs     09/13/22 2113 09/14/22  0513   WBC 22.0* 27.0*   RBC 4.73 4.74      Coagulation Studies Recent Labs     09/13/22  0925   INR 0.99        MEDICATIONS   Inpatient Medications     acetaminophen, 1,000 mg, Oral, Daily    [START ON 9/17/2022] vitamin D, 50,000 Units, Oral, Weekly    famotidine, 20 mg, Oral, BID    ferrous sulfate, 325 mg, Oral, Daily with breakfast    latanoprost, 1 drop, Ophthalmic, Nightly    sodium chloride flush, 5-40 mL, IntraVENous, 2 times per day    bisacodyl, 5 mg, Oral, Daily    dexamethasone, 4 mg, Oral, Q6H   Infusions    sodium chloride      sodium chloride 125 mL/hr at 09/13/22 1854      Antibiotics   Recent Abx Admin                     ceFAZolin (ANCEF) 2,000 mg in sodium chloride 0.9 % 50 mL IVPB minutes in the care of this patient.   Over 50% of that time was in face-to-face counseling regarding post op progression, pain management strategies, and answering patient and family questions

## 2022-09-14 NOTE — PROGRESS NOTES
4 Eyes Admission Assessment     I agree as the admission nurse that 2 RN's have performed a thorough Head to Toe Skin Assessment on the patient. ALL assessment sites listed below have been assessed on admission. Areas assessed by both nurses:   [x]   Head, Face, and Ears   [x]   Shoulders, Back, and Chest  [x]   Arms, Elbows, and Hands   [x]   Coccyx, Sacrum, and Ischium  [x]   Legs, Feet, and Heels        Does the Patient have Skin Breakdown?   No         Giovany Prevention initiated:  Yes   Wound Care Orders initiated:  No      C nurse consulted for Pressure Injury (Stage 3,4, Unstageable, DTI, NWPT, and Complex wounds) or Giovany score 18 or lower:  No      Nurse 1 eSignature: Electronically signed by Kristopher Armando RN on 9/14/22 at 6:53 AM EDT    Nurse 2 eSignature: {Esignature:[id number]}

## 2022-09-14 NOTE — PLAN OF CARE
Problem: Discharge Planning  Goal: Discharge to home or other facility with appropriate resources  9/14/2022 1029 by Marilyn Phipps RN  Outcome: Progressing  Flowsheets (Taken 9/14/2022 0800)  Discharge to home or other facility with appropriate resources:   Identify barriers to discharge with patient and caregiver   Arrange for needed discharge resources and transportation as appropriate   Identify discharge learning needs (meds, wound care, etc)   Arrange for interpreters to assist at discharge as needed   Refer to discharge planning if patient needs post-hospital services based on physician order or complex needs related to functional status, cognitive ability or social support system  9/14/2022 0654 by Suraj Silva RN  Outcome: Progressing  Flowsheets (Taken 9/13/2022 2000)  Discharge to home or other facility with appropriate resources:   Identify barriers to discharge with patient and caregiver   Arrange for needed discharge resources and transportation as appropriate   Identify discharge learning needs (meds, wound care, etc)   Arrange for interpreters to assist at discharge as needed   Refer to discharge planning if patient needs post-hospital services based on physician order or complex needs related to functional status, cognitive ability or social support system     Problem: Pain  Goal: Verbalizes/displays adequate comfort level or baseline comfort level  9/14/2022 1029 by Marilyn Phipps RN  Outcome: Progressing  9/14/2022 0654 by Suraj Silva RN  Outcome: Progressing     Problem: Safety - Adult  Goal: Free from fall injury  9/14/2022 1029 by Marilyn Phipps RN  Outcome: Progressing  9/14/2022 0654 by Suraj Silva RN  Outcome: Progressing  Flowsheets (Taken 9/14/2022 0654)  Free From Fall Injury:   Instruct family/caregiver on patient safety   Based on caregiver fall risk screen, instruct family/caregiver to ask for assistance with transferring infant if caregiver noted to have fall risk factors     Problem: ABCDS Injury Assessment  Goal: Absence of physical injury  9/14/2022 1029 by Lex Robert RN  Outcome: Progressing  9/14/2022 0654 by Sivakumar Siegel RN  Outcome: Progressing

## 2022-09-14 NOTE — OP NOTE
[facility] [address] [address], [address]                                OPERATIVE REPORT    PATIENT NAME: Juan Blake                   :        1960  MED REC NO:   [id number]                          ROOM:       8393  ACCOUNT NO:   [de-identified]                           ADMIT DATE: 2022  PROVIDER:     Chino Saenz MD    DATE OF PROCEDURE:  2022    PREOPERATIVE DIAGNOSIS:  4.5-cm left temporal lobe meningioma. POSTOPERATIVE DIAGNOSIS:  4.5-cm left temporal lobe meningioma. OPERATION PERFORMED:  Image-guided stereotactic left frontotemporal  craniotomy with resection of meningioma using microscope. OPERATING SURGEON:  Chino Saenz MD    ANESTHESIA:  General endotracheal.    ESTIMATED BLOOD LOSS:  150 mL. INDICATIONS:  The patient is a 65-year-old gentleman with history of  speech difficulties, headache, questionable seizures with an MRI  diagnosis of a left 4.5-cm temporal lobe lesion consistent with  meningioma. The patient understood the procedure, risks, and benefits  and agreed to proceed with left-sided craniotomy and resection. PROCEDURE IN DETAIL:  The patient was brought to the operating room. General endotracheal anesthesia was induced. He was placed supine with  a bump under the left shoulder, head in three-point head fixation,  turned to the right. Left head was delineated for lesion. BrainHealthLoop was  then used to register and navigate. A curvilinear, question saeed-type  incision was then fashioned in the left temporal region. The area was  prepped out and draped in sterile fashion. Incision was infiltrated  with local anesthetic, incised with a 10 blade, and carried down to bone  using Bovie cautery. Submuscular dissection was then performed and  reflection of the musculocutaneous flap was then performed.   A keyhole  was easily identified and down to the root of zygoma, was identified. Three heydi holes were then created using the [employer] drill and the B1  bit was then used to create craniotomy. Bone was difficult to remove  due to extensive dural adhesions to the bone, especially anteriorly. Once this was removed, the dura was opened in a C-fashion and flapped  anteriorly. It was noted early on that the dura anteriorly was involved  with the lesion. The underlying brain was intact without any trauma. The lesion was easily identifiable. Dissection around the lesion was  easy to perform using bipolar cautery. The lesion was shrunk in pieces  and sectioned in multiple fragments. One fragment was sent for frozen  section diagnosis which came back consistent with meningioma. Once  majority of the lesion had been removed, it was noted that the pedicle  of the meningioma was off the sphenoid ridge in the temporal area. Using the microscope to get in there for elimination and  microdissection, the remnant of the lesion was then removed, resected. The dura was then scraped and extensively cauterized in this area and  the majority of the exposed dura was actually sectioned and removed. Once this was completely removed, the area was inspected. The area was  hemostased and then, using the dural repair, it was tacked up to the  remaining dura and then sealed with Adherus and then, the bone flap  replaced using miniplates and the muscle was then closed using two  interrupted Vicryl sutures for muscle and separate two interrupted  Vicryl suture layers on the muscle fascia and then, the galea closed  using two interrupted Vicryl sutures and then staples for the scalp. No  complications noted. The patient was then removed from three-point head  fixation, taken to the recovery room in stable condition. No obvious  complications were noted and I certify that I was present and available  for the entire procedure.         Emanuel Lawson MD    D: 09/13/2022 18:21:23       T: 09/13/2022 22:47:13     [email or web address]  Job#: [id number]     Doc#: [id number]    CC:

## 2022-09-14 NOTE — CONSULTS
Clinical Pharmacy Progress Note  Medication History     Admit Date: 9/13/2022    Pharmacy consulted to verify home medication list by ROSLYN Case. List of of current medications patient is taking is complete. Home Medication list in EPIC updated to reflect changes noted below. Source of information: patient interview, pharmacy fills    Patient's home pharmacy: [facility]     Changes made to medication list:   Medications removed:   Fexofenadine 180mg daily  Hydrocortisone 2.5% lotion  Methocarbamol 750mg QID  Travoprost opth  Medications added:   Losartan   Potassium chloride  Spironolactone  Melatonin prn  Other notes:   PS sent to [address] to discuss. Current Outpatient Medications   Medication Instructions    acetaminophen (TYLENOL) 1,000 mg, Oral, DAILY    Azelastine-Fluticasone 137-50 MCG/ACT SUSP 1 spray, Each Nostril, 2 times daily    cetirizine (ZYRTEC) 10 mg, Oral, DAILY    Cyanocobalamin ER 1000 MCG TBCR 1 each, Oral, DAILY    dexamethasone (DECADRON) 4 mg, Oral, EVERY 6 HOURS, For 15 days then stop    ergocalciferol (ERGOCALCIFEROL) 50,000 Units, Oral, WEEKLY, Take on Saturdays    famotidine (PEPCID) 20 mg, Oral, 2 TIMES DAILY    ferrous sulfate (IRON 325) 325 mg, Oral, DAILY WITH BREAKFAST    latanoprost (XALATAN) 0.005 % ophthalmic solution 1 drop, Both Eyes, NIGHTLY    losartan (COZAAR) 25 mg, Oral, DAILY    melatonin 5 mg, Oral, NIGHTLY PRN    potassium chloride (KLOR-CON) 20 MEQ packet 60 mEq, Oral, DAILY, Dissolve in low sugar juice    sodium chloride (OCEAN) 0.65 % nasal spray 1 spray, Nasal, 2 times daily    spironolactone (ALDACTONE) 50 mg, Oral, DAILY       Please call with any questions.   Brenna OdonnellD., BCPS   9/14/2022 1:58 PM  PartyWithMe: [phone]

## 2022-09-14 NOTE — PLAN OF CARE
Problem: Discharge Planning  Goal: Discharge to home or other facility with appropriate resources  Outcome: Progressing  Flowsheets (Taken 9/13/2022 2000)  Discharge to home or other facility with appropriate resources:   Identify barriers to discharge with patient and caregiver   Arrange for needed discharge resources and transportation as appropriate   Identify discharge learning needs (meds, wound care, etc)   Arrange for interpreters to assist at discharge as needed   Refer to discharge planning if patient needs post-hospital services based on physician order or complex needs related to functional status, cognitive ability or social support system     Problem: Pain  Goal: Verbalizes/displays adequate comfort level or baseline comfort level  Outcome: Progressing     Problem: Safety - Adult  Goal: Free from fall injury  Outcome: Progressing  Flowsheets (Taken 9/14/2022 0654)  Free From Fall Injury:   Instruct family/caregiver on patient safety   Based on caregiver fall risk screen, instruct family/caregiver to ask for assistance with transferring infant if caregiver noted to have fall risk factors     Problem: ABCDS Injury Assessment  Goal: Absence of physical injury  Outcome: Progressing

## 2022-09-15 LAB
ANION GAP SERPL CALCULATED.3IONS-SCNC: 11 MMOL/L (ref 3–16)
ANION GAP SERPL CALCULATED.3IONS-SCNC: 13 MMOL/L (ref 3–16)
BASOPHILS ABSOLUTE: 0 K/UL (ref 0–0.2)
BASOPHILS ABSOLUTE: 0 K/UL (ref 0–0.2)
BASOPHILS RELATIVE PERCENT: 0 %
BASOPHILS RELATIVE PERCENT: 0.2 %
BUN BLDV-MCNC: 10 MG/DL (ref 7–20)
BUN BLDV-MCNC: 12 MG/DL (ref 7–20)
CALCIUM SERPL-MCNC: 7.7 MG/DL (ref 8.3–10.6)
CALCIUM SERPL-MCNC: 7.9 MG/DL (ref 8.3–10.6)
CHLORIDE BLD-SCNC: 98 MMOL/L (ref 99–110)
CHLORIDE BLD-SCNC: 99 MMOL/L (ref 99–110)
CO2: 24 MMOL/L (ref 21–32)
CO2: 26 MMOL/L (ref 21–32)
CREAT SERPL-MCNC: 0.7 MG/DL (ref 0.8–1.3)
CREAT SERPL-MCNC: 0.7 MG/DL (ref 0.8–1.3)
EKG ATRIAL RATE: 79 BPM
EKG DIAGNOSIS: NORMAL
EKG P AXIS: 54 DEGREES
EKG P-R INTERVAL: 146 MS
EKG Q-T INTERVAL: 420 MS
EKG QRS DURATION: 100 MS
EKG QTC CALCULATION (BAZETT): 481 MS
EKG R AXIS: -5 DEGREES
EKG T AXIS: 64 DEGREES
EKG VENTRICULAR RATE: 79 BPM
EOSINOPHILS ABSOLUTE: 0 K/UL (ref 0–0.6)
EOSINOPHILS ABSOLUTE: 0 K/UL (ref 0–0.6)
EOSINOPHILS RELATIVE PERCENT: 0 %
EOSINOPHILS RELATIVE PERCENT: 0 %
GFR AFRICAN AMERICAN: >60
GFR AFRICAN AMERICAN: >60
GFR NON-AFRICAN AMERICAN: >60
GFR NON-AFRICAN AMERICAN: >60
GLUCOSE BLD-MCNC: 116 MG/DL (ref 70–99)
GLUCOSE BLD-MCNC: 130 MG/DL (ref 70–99)
GLUCOSE BLD-MCNC: 134 MG/DL (ref 70–99)
GLUCOSE BLD-MCNC: 137 MG/DL (ref 70–99)
GLUCOSE BLD-MCNC: 141 MG/DL (ref 70–99)
HCT VFR BLD CALC: 36.8 % (ref 40.5–52.5)
HCT VFR BLD CALC: 37.2 % (ref 40.5–52.5)
HEMOGLOBIN: 12.1 G/DL (ref 13.5–17.5)
HEMOGLOBIN: 12.3 G/DL (ref 13.5–17.5)
LV EF: 58 %
LVEF MODALITY: NORMAL
LYMPHOCYTES ABSOLUTE: 0.6 K/UL (ref 1–5.1)
LYMPHOCYTES ABSOLUTE: 0.7 K/UL (ref 1–5.1)
LYMPHOCYTES RELATIVE PERCENT: 2.6 %
LYMPHOCYTES RELATIVE PERCENT: 3.3 %
Lab: NORMAL
MAGNESIUM: 1.9 MG/DL (ref 1.8–2.4)
MCH RBC QN AUTO: 28.2 PG (ref 26–34)
MCH RBC QN AUTO: 28.3 PG (ref 26–34)
MCHC RBC AUTO-ENTMCNC: 32.8 G/DL (ref 31–36)
MCHC RBC AUTO-ENTMCNC: 33.1 G/DL (ref 31–36)
MCV RBC AUTO: 85.3 FL (ref 80–100)
MCV RBC AUTO: 85.9 FL (ref 80–100)
MONOCYTES ABSOLUTE: 1.5 K/UL (ref 0–1.3)
MONOCYTES ABSOLUTE: 1.8 K/UL (ref 0–1.3)
MONOCYTES RELATIVE PERCENT: 7.4 %
MONOCYTES RELATIVE PERCENT: 7.5 %
NEUTROPHILS ABSOLUTE: 18.3 K/UL (ref 1.7–7.7)
NEUTROPHILS ABSOLUTE: 21.1 K/UL (ref 1.7–7.7)
NEUTROPHILS RELATIVE PERCENT: 89.3 %
NEUTROPHILS RELATIVE PERCENT: 89.7 %
PDW BLD-RTO: 13.9 % (ref 12.4–15.4)
PDW BLD-RTO: 14.3 % (ref 12.4–15.4)
PERFORMED ON: ABNORMAL
PLATELET # BLD: 161 K/UL (ref 135–450)
PLATELET # BLD: 164 K/UL (ref 135–450)
PMV BLD AUTO: 7.7 FL (ref 5–10.5)
PMV BLD AUTO: 7.8 FL (ref 5–10.5)
POTASSIUM REFLEX MAGNESIUM: 3.5 MMOL/L (ref 3.5–5.1)
POTASSIUM REFLEX MAGNESIUM: 3.8 MMOL/L (ref 3.5–5.1)
RBC # BLD: 4.29 M/UL (ref 4.2–5.9)
RBC # BLD: 4.36 M/UL (ref 4.2–5.9)
REASON FOR REJECTION: NORMAL
REJECTED TEST: NORMAL
REPORT: NORMAL
SODIUM BLD-SCNC: 133 MMOL/L (ref 136–145)
SODIUM BLD-SCNC: 138 MMOL/L (ref 136–145)
THIS TEST SENT TO: NORMAL
WBC # BLD: 20.5 K/UL (ref 4–11)
WBC # BLD: 23.5 K/UL (ref 4–11)

## 2022-09-15 PROCEDURE — 85025 COMPLETE CBC W/AUTO DIFF WBC: CPT

## 2022-09-15 PROCEDURE — 93005 ELECTROCARDIOGRAM TRACING: CPT | Performed by: INTERNAL MEDICINE

## 2022-09-15 PROCEDURE — 97530 THERAPEUTIC ACTIVITIES: CPT

## 2022-09-15 PROCEDURE — 97165 OT EVAL LOW COMPLEX 30 MIN: CPT

## 2022-09-15 PROCEDURE — 2060000000 HC ICU INTERMEDIATE R&B

## 2022-09-15 PROCEDURE — 83735 ASSAY OF MAGNESIUM: CPT

## 2022-09-15 PROCEDURE — 2580000003 HC RX 258: Performed by: NURSE PRACTITIONER

## 2022-09-15 PROCEDURE — 6370000000 HC RX 637 (ALT 250 FOR IP): Performed by: NURSE PRACTITIONER

## 2022-09-15 PROCEDURE — C8929 TTE W OR WO FOL WCON,DOPPLER: HCPCS

## 2022-09-15 PROCEDURE — 6360000002 HC RX W HCPCS: Performed by: NURSE PRACTITIONER

## 2022-09-15 PROCEDURE — 6360000004 HC RX CONTRAST MEDICATION: Performed by: INTERNAL MEDICINE

## 2022-09-15 PROCEDURE — 97161 PT EVAL LOW COMPLEX 20 MIN: CPT

## 2022-09-15 PROCEDURE — 36415 COLL VENOUS BLD VENIPUNCTURE: CPT

## 2022-09-15 PROCEDURE — 80048 BASIC METABOLIC PNL TOTAL CA: CPT

## 2022-09-15 PROCEDURE — 93010 ELECTROCARDIOGRAM REPORT: CPT | Performed by: INTERNAL MEDICINE

## 2022-09-15 PROCEDURE — 6370000000 HC RX 637 (ALT 250 FOR IP): Performed by: NEUROLOGICAL SURGERY

## 2022-09-15 PROCEDURE — 2580000003 HC RX 258: Performed by: NEUROLOGICAL SURGERY

## 2022-09-15 PROCEDURE — 97535 SELF CARE MNGMENT TRAINING: CPT

## 2022-09-15 PROCEDURE — 6360000002 HC RX W HCPCS: Performed by: NEUROLOGICAL SURGERY

## 2022-09-15 PROCEDURE — 97116 GAIT TRAINING THERAPY: CPT

## 2022-09-15 RX ADMIN — METHOCARBAMOL 750 MG: 750 TABLET ORAL at 23:41

## 2022-09-15 RX ADMIN — METHOCARBAMOL 750 MG: 750 TABLET ORAL at 11:00

## 2022-09-15 RX ADMIN — SPIRONOLACTONE 25 MG: 25 TABLET, FILM COATED ORAL at 08:51

## 2022-09-15 RX ADMIN — DEXAMETHASONE 4 MG: 4 TABLET ORAL at 14:15

## 2022-09-15 RX ADMIN — METHOCARBAMOL 1000 MG: 100 INJECTION, SOLUTION INTRAMUSCULAR; INTRAVENOUS at 02:46

## 2022-09-15 RX ADMIN — METHOCARBAMOL 750 MG: 750 TABLET ORAL at 16:31

## 2022-09-15 RX ADMIN — PANTOPRAZOLE SODIUM 40 MG: 40 TABLET, DELAYED RELEASE ORAL at 06:26

## 2022-09-15 RX ADMIN — POTASSIUM CHLORIDE 60 MEQ: 1500 TABLET, EXTENDED RELEASE ORAL at 08:51

## 2022-09-15 RX ADMIN — ACETAMINOPHEN 1000 MG: 500 TABLET ORAL at 08:51

## 2022-09-15 RX ADMIN — FERROUS SULFATE TAB 325 MG (65 MG ELEMENTAL FE) 325 MG: 325 (65 FE) TAB at 08:51

## 2022-09-15 RX ADMIN — LEVETIRACETAM 500 MG: 500 TABLET, FILM COATED ORAL at 20:52

## 2022-09-15 RX ADMIN — HEPARIN SODIUM 5000 UNITS: 5000 INJECTION INTRAVENOUS; SUBCUTANEOUS at 14:15

## 2022-09-15 RX ADMIN — DEXAMETHASONE 4 MG: 4 TABLET ORAL at 20:52

## 2022-09-15 RX ADMIN — LATANOPROST 1 DROP: 50 SOLUTION OPHTHALMIC at 20:45

## 2022-09-15 RX ADMIN — HEPARIN SODIUM 5000 UNITS: 5000 INJECTION INTRAVENOUS; SUBCUTANEOUS at 06:26

## 2022-09-15 RX ADMIN — ACETAMINOPHEN 650 MG: 325 TABLET, FILM COATED ORAL at 23:41

## 2022-09-15 RX ADMIN — SODIUM CHLORIDE, PRESERVATIVE FREE 10 ML: 5 INJECTION INTRAVENOUS at 09:08

## 2022-09-15 RX ADMIN — PERFLUTREN 1.65 MG: 6.52 INJECTION, SUSPENSION INTRAVENOUS at 15:19

## 2022-09-15 RX ADMIN — POLYETHYLENE GLYCOL 3350 17 G: 17 POWDER, FOR SOLUTION ORAL at 08:51

## 2022-09-15 RX ADMIN — LEVETIRACETAM 500 MG: 500 TABLET, FILM COATED ORAL at 08:51

## 2022-09-15 RX ADMIN — BISACODYL 5 MG: 5 TABLET, COATED ORAL at 08:51

## 2022-09-15 RX ADMIN — DEXAMETHASONE 4 MG: 4 TABLET ORAL at 08:51

## 2022-09-15 RX ADMIN — Medication 3 MG: at 20:52

## 2022-09-15 RX ADMIN — DEXAMETHASONE 4 MG: 4 TABLET ORAL at 02:44

## 2022-09-15 RX ADMIN — LOSARTAN POTASSIUM 25 MG: 25 TABLET, FILM COATED ORAL at 08:50

## 2022-09-15 RX ADMIN — HEPARIN SODIUM 5000 UNITS: 5000 INJECTION INTRAVENOUS; SUBCUTANEOUS at 20:52

## 2022-09-15 ASSESSMENT — PAIN SCALES - GENERAL
PAINLEVEL_OUTOF10: 1
PAINLEVEL_OUTOF10: 1
PAINLEVEL_OUTOF10: 0

## 2022-09-15 ASSESSMENT — PAIN DESCRIPTION - ORIENTATION: ORIENTATION: LEFT

## 2022-09-15 ASSESSMENT — PAIN DESCRIPTION - PAIN TYPE: TYPE: SURGICAL PAIN

## 2022-09-15 ASSESSMENT — PAIN - FUNCTIONAL ASSESSMENT: PAIN_FUNCTIONAL_ASSESSMENT: ACTIVITIES ARE NOT PREVENTED

## 2022-09-15 ASSESSMENT — PAIN DESCRIPTION - LOCATION: LOCATION: HEAD

## 2022-09-15 NOTE — CONSULTS
Interventional cardiology consult    Seen for arrhythmia. Full note to follow. Pietro Jaffe M.D.,FACC

## 2022-09-15 NOTE — PLAN OF CARE
Problem: Discharge Planning  Goal: Discharge to home or other facility with appropriate resources  Outcome: Progressing  Flowsheets (Taken 9/14/2022 2000)  Discharge to home or other facility with appropriate resources:   Arrange for interpreters to assist at discharge as needed   Identify barriers to discharge with patient and caregiver   Arrange for needed discharge resources and transportation as appropriate   Identify discharge learning needs (meds, wound care, etc)   Refer to discharge planning if patient needs post-hospital services based on physician order or complex needs related to functional status, cognitive ability or social support system     Problem: Pain  Goal: Verbalizes/displays adequate comfort level or baseline comfort level  Outcome: Progressing  Flowsheets (Taken 9/14/2022 2000)  Verbalizes/displays adequate comfort level or baseline comfort level:   Encourage patient to monitor pain and request assistance   Implement non-pharmacological measures as appropriate and evaluate response   Consider cultural and social influences on pain and pain management   Notify Licensed Independent Practitioner if interventions unsuccessful or patient reports new pain   Assess pain using appropriate pain scale   Administer analgesics based on type and severity of pain and evaluate response     Problem: Safety - Adult  Goal: Free from fall injury  Outcome: Progressing  Flowsheets (Taken 9/15/2022 0704)  Free From Fall Injury:   Instruct family/caregiver on patient safety   Based on caregiver fall risk screen, instruct family/caregiver to ask for assistance with transferring infant if caregiver noted to have fall risk factors     Problem: ABCDS Injury Assessment  Goal: Absence of physical injury  Outcome: Progressing  Flowsheets (Taken 9/15/2022 0704)  Absence of Physical Injury: Implement safety measures based on patient assessment

## 2022-09-15 NOTE — PLAN OF CARE
Problem: Discharge Planning  Goal: Discharge to home or other facility with appropriate resources  9/15/2022 1044 by Luisa Acosta RN  Outcome: Progressing  Flowsheets (Taken 9/15/2022 0800)  Discharge to home or other facility with appropriate resources:   Identify barriers to discharge with patient and caregiver   Arrange for needed discharge resources and transportation as appropriate   Identify discharge learning needs (meds, wound care, etc)   Arrange for interpreters to assist at discharge as needed   Refer to discharge planning if patient needs post-hospital services based on physician order or complex needs related to functional status, cognitive ability or social support system  9/15/2022 0704 by Ranjith Augustin RN  Outcome: Progressing  Flowsheets (Taken 9/14/2022 2000)  Discharge to home or other facility with appropriate resources:   Arrange for interpreters to assist at discharge as needed   Identify barriers to discharge with patient and caregiver   Arrange for needed discharge resources and transportation as appropriate   Identify discharge learning needs (meds, wound care, etc)   Refer to discharge planning if patient needs post-hospital services based on physician order or complex needs related to functional status, cognitive ability or social support system     Problem: Pain  Goal: Verbalizes/displays adequate comfort level or baseline comfort level  9/15/2022 1044 by Luisa Acosta RN  Outcome: Progressing  9/15/2022 0704 by Ranjith Augustin RN  Outcome: Progressing  Flowsheets (Taken 9/14/2022 2000)  Verbalizes/displays adequate comfort level or baseline comfort level:   Encourage patient to monitor pain and request assistance   Implement non-pharmacological measures as appropriate and evaluate response   Consider cultural and social influences on pain and pain management   Notify Licensed Independent Practitioner if interventions unsuccessful or patient reports new pain   Assess pain using appropriate pain scale   Administer analgesics based on type and severity of pain and evaluate response     Problem: Safety - Adult  Goal: Free from fall injury  9/15/2022 1044 by Luisa Acosta RN  Outcome: Progressing  9/15/2022 0704 by Ranjith Augustin RN  Outcome: Progressing  Flowsheets (Taken 9/15/2022 0704)  Free From Fall Injury:   Luisito Resendizo family/caregiver on patient safety   Based on caregiver fall risk screen, instruct family/caregiver to ask for assistance with transferring infant if caregiver noted to have fall risk factors     Problem: ABCDS Injury Assessment  Goal: Absence of physical injury  9/15/2022 1044 by Luisa Acosta RN  Outcome: Progressing  9/15/2022 0704 by Ranjith Augustin RN  Outcome: Progressing  Flowsheets (Taken 9/15/2022 0704)  Absence of Physical Injury: Implement safety measures based on patient assessment

## 2022-09-15 NOTE — CARE COORDINATION
Case management is following for discharge planning. The chart was reviewed. The pt is from home alone in a 2 family house. His sister lives upstairs. He is independent with self care and functional mobility. PT/OT eval's today. PT AM-PAC: 21 / 24 per last evaluation on: 9/15    OT AM-PAC: 24 / 24 per last evaluation on: 9/15    The plan is to return home. No services needed. Family will provide transportation.     Electronically signed by MALU Solorio RN-[facility]  Case Management  [phone]

## 2022-09-15 NOTE — PROGRESS NOTES
NEUROSURGERY POST-OP PROGRESS NOTE    Patient Name: Alejandro Marino YOB: 1960   Sex: Male Age: 63 yrs     Medical Record Number: [id number] Acct Number: [de-identified]   Room Number: 2971/0940-58 Hospital Day: Hospital Day: 3     Interval History:  Post-operative Day# 2 s/p Procedure(s) (LRB):  LEFT TEMPORAL CRANIOTOMY FOR RESECTION OF TUMOR WITH IMAGE GUIDANCE (Left)    Subjective: Pt has very little incision pain    Objective:    VITAL SIGNS   /86   Pulse 69   Temp 98.4 °F (36.9 °C) (Oral)   Resp 15   Ht 6' (1.829 m)   Wt 279 lb 9.6 oz (126.8 kg)   SpO2 97%   BMI 37.92 kg/m²    Height Height: 6' (182.9 cm)   Weight Weight: 279 lb 9.6 oz (126.8 kg)        Allergies No Known Allergies   NPO Status ADULT DIET;  Regular   Isolation No active isolations     LABS   Basic Metabolic Profile Recent Labs     09/14/22  0513 09/14/22  0628 09/15/22  0455   * 140 138   CL 99 100 99   CO2 27 30 26   BUN 9 10 10   CREATININE 0.8 0.8 0.7*   GLUCOSE 129* 128* 116*   MG  --   --  1.90      Complete Blood Count Recent Labs     09/13/22 2113 09/14/22  0513 09/15/22  0455   WBC 22.0* 27.0* 23.5*   RBC 4.73 4.74 4.36      Coagulation Studies Recent Labs     09/13/22  0925   INR 0.99        MEDICATIONS   Inpatient Medications     levETIRAcetam, 500 mg, Oral, BID    pantoprazole, 40 mg, Oral, QAM AC    [COMPLETED] methocarbamol IVPB, 1,000 mg, IntraVENous, Q8H **FOLLOWED BY** methocarbamol, 750 mg, Oral, Q6H    polyethylene glycol, 17 g, Oral, Daily    heparin (porcine), 5,000 Units, SubCUTAneous, 3 times per day    losartan, 25 mg, Oral, Daily    potassium chloride, 60 mEq, Oral, Daily with breakfast    spironolactone, 25 mg, Oral, Daily    acetaminophen, 1,000 mg, Oral, Daily    [START ON 9/17/2022] vitamin D, 50,000 Units, Oral, Weekly ferrous sulfate, 325 mg, Oral, Daily with breakfast    latanoprost, 1 drop, Ophthalmic, Nightly    sodium chloride flush, 5-40 mL, IntraVENous, 2 times per day    bisacodyl, 5 mg, Oral, Daily    dexamethasone, 4 mg, Oral, Q6H   Infusions    sodium chloride        Antibiotics   Recent Abx Admin        No antibiotic orders with administrations found. Neurologic Exam:  Mental status: awake and alert and oriented x4    Cranial Nerves:  II: Visual acuity not tested, visual fields full, denies new visual changes / diplopia  III, IV, VI: PERRL, 3 mm bilaterally, EOMI, no nystagmus noted  V: Facial sensation intact bilaterally to touch  VII: Face symmetric  VIII: Hearing intact bilaterally to spoken voice  IX: Palate movement equal bilaterally  XI: Shoulder shrug equal bilaterally  XII: Tongue midline    Musculoskeletal:   Gait: Not tested   Tone: normal  Sensory: intact to all extremities  Motor strength:    Right  Left    Right  Left    Deltoid  5 5  Hip Flex  5 5   Biceps  5 5  Knee Extensors  5 5   Triceps  5 5  Knee Flexors  5 5   Wrist Ext  5 5  Ankle Dorsiflex. 5 5   Wrist Flex  5 5  Ankle Plantarflex. 5 5   Handgrip  5 5  Ext Nish Longus  5 5   Thumb Ext  5 5         Incision: intact, clean and dry      Respiratory:  Unlabored respiratory pattern    Abdomen:   Soft, ND       Cardiovascular:  Warm, well perfused    Assessment   Patient is a 62 yo M s/p Procedure(s) (LRB):  LEFT TEMPORAL CRANIOTOMY FOR RESECTION OF TUMOR WITH IMAGE GUIDANCE (Left) per Dr. Marycarmen Blair . Pt educated from [employer] to Neurosurgery\" book pg 1-4 , 25-27, 33-38.      Plan:  Neurologic exam frequency:q4  Mobility:PT/OT eval  Steroids: decadron taper  Seizure Prophylaxis:keppra  Diet:as tolerated  Antibiotics:completed  DVT Prophylaxis: SCDs and heparin sq  GI Prophylaxis:protonix  Bowel Regimen:glycolax and dulcolax  Pain control:guerrero and robaxin  Cardiology consult for [facility] this am  Incisional Care: clean with soapy water and cover with CDD daily  Dispo Planning:out of ICU to [facility]    Patient was seen with Dr. Elver Huynh who agrees with above assessment and plan. Electronically signed by: Lennox Justin, APRN - CNP, 9/15/2022 3:07 PM   Neurosurgery Nurse Practitioner  [phone]   I spent 30 minutes in the care of this patient.   Over 50% of that time was in face-to-face counseling regarding post op progression, pain management strategies, and answering patient and family questions

## 2022-09-15 NOTE — PROGRESS NOTES
Physical Therapy  Facility/Department: [facility] ICU  Physical Therapy Initial Assessment/Discharge    Name: Avi Ramires  : 1960  MRN: [id number]  Date of Service: 9/15/2022    Discharge Recommendations:    Avi Ramires scored a 23/24 on the AM-PAC short mobility form. At this time, no further PT is recommended upon discharge. Recommend patient returns to prior setting with prior services. PT Equipment Recommendations  Equipment Needed: No      Patient Diagnosis(es): The encounter diagnosis was Brain neoplasm ([address] Past Medical History:  has a past medical history of Brain mass, Cancer ([facility] [address] Flashbacks ([facility] [address] Glaucoma, Hypertension, Hypokalemia, Kidney anomaly, congenital, Lumbar spinal stenosis, Memory deficit, Osteoarthritis, Prediabetes, and Sinus problem. Past Surgical History:  has a past surgical history that includes Colonoscopy; Tonsillectomy and adenoidectomy; sinus surgery; and craniotomy (Left, 2022). Assessment   Assessment: pt presents s/p craniotomy with no notable mobility deficits. He is able to perform bed mobility and transfers without assistive device independently. He ambulates within his room and a lap around the hallway >300ft without device, SPV provided for safety. Pt able to complete dual task ambulation with head turns, change in direction, and engaging in conversation without LOB or safety concerns. Pt also negotiates a flight of stairs with SPV for safety with no LOB. Pt lives alone with his sister upstairs in a 2-family home. He reports that she will be home  and can provide assist PRN. Pt has no further PT needs at this time.   Decision Making: Low Complexity  Requires PT Follow-Up: No  Activity Tolerance  Activity Tolerance: Patient tolerated evaluation without incident     Plan   Safety Devices  Type of Devices: Left in chair, Call light within reach, Chair alarm in place, Nurse notified     Restrictions  Position Activity Restriction  Other position/activity restrictions: ambulate patient     Subjective   General  Chart Reviewed: Yes  Patient assessed for rehabilitation services?: Yes  Additional Pertinent Hx: LEFT TEMPORAL CRANIOTOMY FOR RESECTION OF TUMOR WITH IMAGE GUIDANCE  Referring Practitioner: ROSLYN Clark CNP  Referral Date : 09/14/22  Diagnosis: brain tumor  General Comment  Comments: pt cleared to see by RN  Subjective  Subjective: pt semisupine in bed, pleasant and agreeable to therapy. Reports pain in head 1.5/10. RN present at end session. Pt very talkative throughout.          Social/Functional History  Social/Functional History  Lives With: Alone  Type of Home: House (two family home, sister lives upstairs)  Home Layout: Able to Live on Main level with bedroom/bathroom  Home Access: Stairs to enter without rails  Entrance Stairs - Number of Steps: 1  Bathroom Shower/Tub: Tub/Shower unit (2 steps up to bathroom)  Bathroom Toilet: Standard  Bathroom Equipment: Grab bars in shower (sink beside toilet)  Home Equipment: Leslie Nutley, rolling, Long-handled shoehorn, Sock aid, Grab bars, Reacher (long handled sponge)  Has the patient had two or more falls in the past year or any fall with injury in the past year?: No  ADL Assistance: Independent  Homemaking Assistance: Independent  Ambulation Assistance: Independent  Transfer Assistance: Independent  Active : Yes  Occupation: On disability  Type of Occupation: [address] Plays with 7yo nephew, video games, walking on treadmill, watching movies  Vision/Hearing  Vision  Vision: Impaired  Vision Exceptions: Wears glasses at all times  Hearing  Hearing: Within functional limits    Cognition   Orientation  Orientation Level: Oriented X4  Cognition  Overall Cognitive Status: WNL     Objective   Heart Rate: 70  Heart Rate Source: Monitor  BP: 117/86  BP Location: Left upper arm  BP Method: Automatic  Patient Position: Semi fowlers  MAP (Calculated): 96.33  Resp: 13  SpO2: 97 %  O2 Device: None (Room air)     Observation/Palpation  Edema: BLE edema noted, RN aware. Pt reports this is due to not receiving his medication yet. Gross Assessment  AROM: Within functional limits  PROM: Within functional limits  Strength: Within functional limits  Coordination: Within functional limits  Tone: Normal  Sensation: Intact            Balance  Sitting: Intact  Standing: Intact  Bed mobility  Supine to Sit: Independent  Scooting: Independent  Bed Mobility Comments: HOB slightly elevated. Transfers  Sit to Stand: Independent  Stand to sit: Independent  Comment: No device, stands from bed, toilet IND. Ambulation  Surface: level tile  Device: No Device  Assistance: Supervision  Gait Deviations: None  Distance: 300ft  Comments: No device, pt ambulates lap around hallway with SPV for safety, pt reports he feels \"stiff\" from being in bed but has no gait difficulties or LOB. Stairs/Curb  Stairs?: Yes  Stairs  # Steps : 12  Stairs Height: 6\"  Rails: Right ascending  Assistance: Supervision  Comment: reciprocal pattern, use of 1 UE on rail at all times, SPV provided for safety. Pt completes without LOB, states he feels comfortable completing. AM-PAC Score  AM-PAC Inpatient Mobility Raw Score : 23 (09/15/22 0948)  AM-PAC Inpatient T-Scale Score : 56.93 (09/15/22 0948)  Mobility Inpatient CMS 0-100% Score: 11.2 (09/15/22 0948)  Mobility Inpatient CMS G-Code Modifier : CI (09/15/22 0948)        Education  Patient Education  Education Given To: Patient  Education Provided: Role of Therapy; Fall Prevention Strategies  Education Method: Verbal  Barriers to Learning: None  Education Outcome: Verbalized understanding      Therapy Time   Individual Concurrent Group Co-treatment   Time In 0820         Time Out 0900         Minutes 40          Timed Code Treatment Minutes: 25    Total Treatment Minutes:40  Breana Colvin PT, DPT, NCS, CSRS          Breana Colvin, [place]

## 2022-09-15 NOTE — CONSULTS
[facility]    Cardiology Consult/H&P  Consulting Cardiologist Madison Benitez MD , [facility] [place]  Referring Provider:  No primary care provider on file. 9/15/2022, 4:53 PM          Primary Cardiologist:  Asked by Shannan Slater MD/No primary care provider on file. to evaluate his patient    Reason for consult bradycardia    History of Present Illness:   Karyle Gale is a 63 y.o. male is being seen to assess his bradycardia. The patient had recent neurological surgery because of \"tumors\". This is his second or third time a craniotomy over a period of time. He is awake and alert and able to give a significant amount of in history. Denies any chest pain or shortness of breath and no syncope. No known cardiac history. Does have some hypertension. At this time he is fairly awake and alert moves all of his extremities fairly well. His head is bandaged from the recent craniotomy procedure. Hypertension  Hyperlipidemia  Recurring brain masses surgically treated  Past Medical History:   has a past medical history of Brain mass, Cancer ([address] Flashbacks ([address] Glaucoma, Hypertension, Hypokalemia, Kidney anomaly, congenital, Lumbar spinal stenosis, Memory deficit, Osteoarthritis, Prediabetes, and Sinus problem. Surgical History:   has a past surgical history that includes Colonoscopy; Tonsillectomy and adenoidectomy; sinus surgery; and craniotomy (Left, 9/13/2022). Social History:   reports that he has never smoked. He has never used smokeless tobacco. He reports that he does not drink alcohol. Family History:  family history is not on file. Home Medications:  Prior to Admission medications    Medication Sig Start Date End Date Taking?  Authorizing Provider   losartan (COZAAR) 25 MG tablet Take 25 mg by mouth daily   Yes Historical Provider, MD   potassium chloride (KLOR-CON) 20 MEQ packet Take 60 mEq by mouth daily Dissolve in low sugar juice   Yes Historical Provider, MD   spironolactone (ALDACTONE) 50 MG tablet Take 50 mg by mouth daily   Yes Historical Provider, MD   melatonin 5 MG TABS tablet Take 5 mg by mouth nightly as needed (insomnia)   Yes Historical Provider, MD   Azelastine-Fluticasone 137-50 MCG/ACT SUSP 1 spray by Each Nostril route in the morning and at bedtime   Yes Historical Provider, MD   cetirizine (ZYRTEC) 10 MG tablet Take 10 mg by mouth daily 6/10/20  Yes Historical Provider, MD   Cyanocobalamin ER 1000 MCG TBCR Take 1 each by mouth daily 5/16/22  Yes Historical Provider, MD   dexamethasone (DECADRON) 4 MG tablet Take 4 mg by mouth in the morning and 4 mg at noon and 4 mg in the evening and 4 mg before bedtime. For 15 days then stop.  9/1/22 9/16/22 Yes Historical Provider, MD   ergocalciferol (ERGOCALCIFEROL) 1.25 MG (06127 UT) capsule Take 50,000 Units by mouth once a week Take on Saturdays   Yes Historical Provider, MD   famotidine (PEPCID) 20 MG tablet Take 20 mg by mouth 2 times daily 9/1/22  Yes Historical Provider, MD   ferrous sulfate (IRON 325) 325 (65 Fe) MG tablet Take 325 mg by mouth daily (with breakfast) 6/27/22  Yes Historical Provider, MD   latanoprost (XALATAN) 0.005 % ophthalmic solution Place 1 drop into both eyes nightly   Yes Historical Provider, MD   sodium chloride (OCEAN) 0.65 % nasal spray 1 spray by Nasal route in the morning and at bedtime   Yes Historical Provider, MD   acetaminophen (TYLENOL) 500 MG tablet Take 1,000 mg by mouth daily    Historical Provider, MD        Current Medications:  Current Facility-Administered Medications   Medication Dose Route Frequency Provider Last Rate Last Admin    levETIRAcetam (KEPPRA) tablet 500 mg  500 mg Oral BID [place] Elia, ROSLYN - CNP   500 mg at 09/15/22 0851    pantoprazole (PROTONIX) tablet 40 mg  40 mg Oral QAM AC [facility], APRN - CNP   40 mg at 09/15/22 6974    methocarbamol (ROBAXIN) tablet 750 mg  750 mg Oral Q6H [place] Elia, APRN - CNP   750 mg at 09/15/22 1631    polyethylene glycol (GLYCOLAX) packet 17 g  17 g Oral Daily Florecita Pritchard APRN - CNP   17 g at 09/15/22 1685    sennosides-docusate sodium (SENOKOT-S) 8.6-50 MG tablet 2 tablet  2 tablet Oral Daily PRN Florecita Pritchard APRN - CNP   2 tablet at 09/14/22 1121    heparin (porcine) injection 5,000 Units  5,000 Units SubCUTAneous 3 times per day Florecita Pritchard APRN - CNP   5,000 Units at 09/15/22 1415    melatonin tablet 3 mg  3 mg Oral Nightly PRN Florecita Pritchard APRN - CNP   3 mg at 09/14/22 2022    losartan (COZAAR) tablet 25 mg  25 mg Oral Daily ROSLYN Saunders - CNP   25 mg at 09/15/22 0850    potassium chloride (KLOR-CON M) extended release tablet 60 mEq  60 mEq Oral Daily with breakfast ROSLYN Saunders - CNP   60 mEq at 09/15/22 3157    spironolactone (ALDACTONE) tablet 25 mg  25 mg Oral Daily Florecita Pritchard APRN - CNP   25 mg at 09/15/22 0851    acetaminophen (TYLENOL) tablet 1,000 mg  1,000 mg Oral Daily Nikki Aragon MD   1,000 mg at 09/15/22 0851    [START ON 9/17/2022] vitamin D (ERGOCALCIFEROL) capsule 50,000 Units  50,000 Units Oral Weekly Nikki Aragon MD        ferrous sulfate (IRON 325) tablet 325 mg  325 mg Oral Daily with breakfast Nikki Aragon MD   325 mg at 09/15/22 0851    latanoprost (XALATAN) 0.005 % ophthalmic solution 1 drop  1 drop Ophthalmic Nightly Nikki Aragon MD   1 drop at 09/14/22 2021    sodium chloride flush 0.9 % injection 5-40 mL  5-40 mL IntraVENous 2 times per day Nikki Aragon MD   10 mL at 09/15/22 0908    sodium chloride flush 0.9 % injection 5-40 mL  5-40 mL IntraVENous PRN Nikki Aragon MD        0.9 % sodium chloride infusion   IntraVENous PRN Nikki Aragon MD        ondansetron (ZOFRAN-ODT) disintegrating tablet 4 mg  4 mg Oral Q8H PRN Nikki Aragon MD        Or    ondansetron [facility]) injection 4 mg  4 mg IntraVENous Q6H PRN Nikki Aragon MD        acetaminophen (TYLENOL) tablet 650 mg  650 mg Oral Q4H PRN Jhoana Aparicio MD        oxyCODONE (ROXICODONE) immediate release tablet 5 mg  5 mg Oral Q4H PRN Jhoana Aparicio MD        Or    oxyCODONE (ROXICODONE) immediate release tablet 10 mg  10 mg Oral Q4H PRN Jhoana Aparicio MD        morphine (PF) injection 2 mg  2 mg IntraVENous Q2H PRN Jhoana Aparicio MD        Or    morphine injection 4 mg  4 mg IntraVENous Q2H PRN Jhoana Aparicio MD        bisacodyl (DULCOLAX) EC tablet 5 mg  5 mg Oral Daily Jhoana Aparicio MD   5 mg at 09/15/22 0851    dexamethasone (DECADRON) tablet 4 mg  4 mg Oral Q6H Jhoana Aparicio MD   4 mg at 09/15/22 1415       Allergies:  Patient has no known allergies. Review of Systems:   Constitutional: there has been no unanticipated weight loss. Eyes: No visual changes or diplopia. No scleral icterus. ENT: No Headaches, hearing loss or vertigo. No mouth sores or sore throat. Cardiovascular: Reviewed in HPI   Pulmonary:  no cough or sputum production. Gastrointestinal: No abdominal pain, appetite loss, blood in stools. No change in bowel or bladder habits. Genitourinary: No dysuria, trouble voiding, or hematuria. Musculoskeletal:  No gait disturbance, weakness or joint complaints. Integumentary: No rash or pruritis. Endocrine: No malaise, fatigue or temperature intolerance. Hematologic/Lymphatic: No abnormal bruising or bleeding, blood clots or swollen lymph nodes. Allergic/Immunologic: No nasal congestion or hives. All other ROS are reviewed and are unremarkable. Physical Examination:    Vitals:    09/15/22 1200 09/15/22 1300 09/15/22 1400 09/15/22 1600   BP:    122/74   Pulse: 64 80 69    Resp: 15 14 15    Temp:    98.6 °F (37 °C)   TempSrc:    Oral   SpO2:    97%   Weight:       Height:            EXAMl and General Appearance:  Healthy. And alert . HEENT: eyes and ears intact.  No nasal masses  THYROID: not enlarged  LUNGS:  Clear to auscultation and percussion  HEART and VASCULAR:  The apical

## 2022-09-16 ENCOUNTER — NURSE ONLY (OUTPATIENT)
Dept: CARDIOLOGY | Age: 62
End: 2022-09-16

## 2022-09-16 VITALS
OXYGEN SATURATION: 95 % | SYSTOLIC BLOOD PRESSURE: 127 MMHG | RESPIRATION RATE: 13 BRPM | WEIGHT: 279.6 LBS | DIASTOLIC BLOOD PRESSURE: 72 MMHG | HEART RATE: 70 BPM | TEMPERATURE: 97.8 F | HEIGHT: 72 IN | BODY MASS INDEX: 37.87 KG/M2

## 2022-09-16 PROBLEM — Z98.890 S/P CRANIOTOMY: Status: ACTIVE | Noted: 2022-09-16

## 2022-09-16 LAB
ANION GAP SERPL CALCULATED.3IONS-SCNC: 10 MMOL/L (ref 3–16)
BASOPHILS ABSOLUTE: 0 K/UL (ref 0–0.2)
BASOPHILS RELATIVE PERCENT: 0 %
BUN BLDV-MCNC: 13 MG/DL (ref 7–20)
CALCIUM SERPL-MCNC: 8.1 MG/DL (ref 8.3–10.6)
CHLORIDE BLD-SCNC: 99 MMOL/L (ref 99–110)
CO2: 28 MMOL/L (ref 21–32)
CREAT SERPL-MCNC: 0.7 MG/DL (ref 0.8–1.3)
EOSINOPHILS ABSOLUTE: 0 K/UL (ref 0–0.6)
EOSINOPHILS RELATIVE PERCENT: 0 %
GFR AFRICAN AMERICAN: >60
GFR NON-AFRICAN AMERICAN: >60
GLUCOSE BLD-MCNC: 121 MG/DL (ref 70–99)
GLUCOSE BLD-MCNC: 121 MG/DL (ref 70–99)
GLUCOSE BLD-MCNC: 137 MG/DL (ref 70–99)
HCT VFR BLD CALC: 36.7 % (ref 40.5–52.5)
HEMOGLOBIN: 12.1 G/DL (ref 13.5–17.5)
LYMPHOCYTES ABSOLUTE: 0.7 K/UL (ref 1–5.1)
LYMPHOCYTES RELATIVE PERCENT: 4.1 %
MCH RBC QN AUTO: 28 PG (ref 26–34)
MCHC RBC AUTO-ENTMCNC: 32.9 G/DL (ref 31–36)
MCV RBC AUTO: 85.1 FL (ref 80–100)
MONOCYTES ABSOLUTE: 1.1 K/UL (ref 0–1.3)
MONOCYTES RELATIVE PERCENT: 6.2 %
NEUTROPHILS ABSOLUTE: 15.9 K/UL (ref 1.7–7.7)
NEUTROPHILS RELATIVE PERCENT: 89.7 %
PDW BLD-RTO: 14 % (ref 12.4–15.4)
PERFORMED ON: ABNORMAL
PERFORMED ON: ABNORMAL
PLATELET # BLD: 169 K/UL (ref 135–450)
PMV BLD AUTO: 7.8 FL (ref 5–10.5)
POTASSIUM REFLEX MAGNESIUM: 3.9 MMOL/L (ref 3.5–5.1)
RBC # BLD: 4.31 M/UL (ref 4.2–5.9)
SODIUM BLD-SCNC: 137 MMOL/L (ref 136–145)
WBC # BLD: 17.7 K/UL (ref 4–11)

## 2022-09-16 PROCEDURE — 36415 COLL VENOUS BLD VENIPUNCTURE: CPT

## 2022-09-16 PROCEDURE — 6370000000 HC RX 637 (ALT 250 FOR IP): Performed by: NURSE PRACTITIONER

## 2022-09-16 PROCEDURE — 80048 BASIC METABOLIC PNL TOTAL CA: CPT

## 2022-09-16 PROCEDURE — 6360000002 HC RX W HCPCS: Performed by: NURSE PRACTITIONER

## 2022-09-16 PROCEDURE — APPNB45 APP NON BILLABLE 31-45 MINUTES: Performed by: NURSE PRACTITIONER

## 2022-09-16 PROCEDURE — 2580000003 HC RX 258: Performed by: NURSE PRACTITIONER

## 2022-09-16 PROCEDURE — 85025 COMPLETE CBC W/AUTO DIFF WBC: CPT

## 2022-09-16 RX ORDER — LEVETIRACETAM 500 MG/1
500 TABLET ORAL 2 TIMES DAILY
Qty: 14 TABLET | Refills: 0 | Status: SHIPPED | OUTPATIENT
Start: 2022-09-16 | End: 2022-09-28

## 2022-09-16 RX ORDER — OXYCODONE HYDROCHLORIDE 5 MG/1
5 TABLET ORAL EVERY 6 HOURS PRN
Qty: 28 TABLET | Refills: 0 | Status: SHIPPED | OUTPATIENT
Start: 2022-09-16 | End: 2022-09-16 | Stop reason: HOSPADM

## 2022-09-16 RX ORDER — DEXAMETHASONE 4 MG/1
TABLET ORAL
Qty: 12 TABLET | Refills: 0 | Status: SHIPPED | OUTPATIENT
Start: 2022-09-16 | End: 2022-09-22

## 2022-09-16 RX ORDER — SENNA AND DOCUSATE SODIUM 50; 8.6 MG/1; MG/1
2 TABLET, FILM COATED ORAL DAILY PRN
COMMUNITY
Start: 2022-09-16

## 2022-09-16 RX ORDER — POLYETHYLENE GLYCOL 3350 17 G/17G
17 POWDER, FOR SOLUTION ORAL DAILY
Qty: 527 G | Refills: 1 | COMMUNITY
Start: 2022-09-17 | End: 2022-10-17

## 2022-09-16 RX ADMIN — LEVETIRACETAM 500 MG: 500 TABLET, FILM COATED ORAL at 08:26

## 2022-09-16 RX ADMIN — HEPARIN SODIUM 5000 UNITS: 5000 INJECTION INTRAVENOUS; SUBCUTANEOUS at 06:39

## 2022-09-16 RX ADMIN — LOSARTAN POTASSIUM 25 MG: 25 TABLET, FILM COATED ORAL at 08:26

## 2022-09-16 RX ADMIN — HEPARIN SODIUM 5000 UNITS: 5000 INJECTION INTRAVENOUS; SUBCUTANEOUS at 14:37

## 2022-09-16 RX ADMIN — ACETAMINOPHEN 1000 MG: 500 TABLET ORAL at 08:26

## 2022-09-16 RX ADMIN — SPIRONOLACTONE 25 MG: 25 TABLET, FILM COATED ORAL at 08:26

## 2022-09-16 RX ADMIN — ACETAMINOPHEN 650 MG: 325 TABLET, FILM COATED ORAL at 12:43

## 2022-09-16 RX ADMIN — PANTOPRAZOLE SODIUM 40 MG: 40 TABLET, DELAYED RELEASE ORAL at 06:39

## 2022-09-16 RX ADMIN — METHOCARBAMOL 750 MG: 750 TABLET ORAL at 06:39

## 2022-09-16 RX ADMIN — SODIUM CHLORIDE, PRESERVATIVE FREE 5 ML: 5 INJECTION INTRAVENOUS at 08:26

## 2022-09-16 RX ADMIN — DEXAMETHASONE 4 MG: 4 TABLET ORAL at 04:12

## 2022-09-16 RX ADMIN — METHOCARBAMOL 750 MG: 750 TABLET ORAL at 12:42

## 2022-09-16 RX ADMIN — FERROUS SULFATE TAB 325 MG (65 MG ELEMENTAL FE) 325 MG: 325 (65 FE) TAB at 08:26

## 2022-09-16 RX ADMIN — POLYETHYLENE GLYCOL 3350 17 G: 17 POWDER, FOR SOLUTION ORAL at 08:26

## 2022-09-16 RX ADMIN — POTASSIUM CHLORIDE 60 MEQ: 1500 TABLET, EXTENDED RELEASE ORAL at 08:26

## 2022-09-16 RX ADMIN — BISACODYL 5 MG: 5 TABLET, COATED ORAL at 08:26

## 2022-09-16 RX ADMIN — DEXAMETHASONE 4 MG: 4 TABLET ORAL at 14:37

## 2022-09-16 RX ADMIN — DEXAMETHASONE 4 MG: 4 TABLET ORAL at 08:26

## 2022-09-16 ASSESSMENT — PAIN SCALES - GENERAL: PAINLEVEL_OUTOF10: 0

## 2022-09-16 NOTE — PROGRESS NOTES
NEUROSURGERY POST-OP PROGRESS NOTE    Patient Name: Jimmy Elkins YOB: 1960   Sex: Male Age: 63 yrs     Medical Record Number: [id number] Acct Number: [de-identified]   Room Number: 6123/3807-90 Hospital Day: Hospital Day: 4     Interval History:  Post-operative Day# 3 s/p Procedure(s) (LRB):  LEFT TEMPORAL CRANIOTOMY FOR RESECTION OF TUMOR WITH IMAGE GUIDANCE (Left)    Subjective: Pt feeling better today    Objective:    VITAL SIGNS   /73   Pulse 58   Temp 97.5 °F (36.4 °C) (Oral)   Resp 12   Ht 6' (1.829 m)   Wt 279 lb 9.6 oz (126.8 kg)   SpO2 96%   BMI 37.92 kg/m²    Height Height: 6' (182.9 cm)   Weight Weight: 279 lb 9.6 oz (126.8 kg)        Allergies No Known Allergies   NPO Status ADULT DIET; Regular   Isolation No active isolations     LABS   Basic Metabolic Profile Recent Labs     09/15/22  0455 09/15/22  2203 09/16/22  0626    133* 137   CL 99 98* 99   CO2 26 24 28   BUN 10 12 13   CREATININE 0.7* 0.7* 0.7*   GLUCOSE 116* 137* 137*   MG 1.90  --   --       Complete Blood Count Recent Labs     09/15/22  0455 09/15/22  2259 09/16/22  0626   WBC 23.5* 20.5* 17.7*   RBC 4.36 4.29 4.31      Coagulation Studies No results for input(s): PTT, INR in the last 72 hours.     Invalid input(s): PLATELETS, PROA, PT, PTTA     MEDICATIONS   Inpatient Medications     levETIRAcetam, 500 mg, Oral, BID    pantoprazole, 40 mg, Oral, QAM AC    [COMPLETED] methocarbamol IVPB, 1,000 mg, IntraVENous, Q8H **FOLLOWED BY** methocarbamol, 750 mg, Oral, Q6H    polyethylene glycol, 17 g, Oral, Daily    heparin (porcine), 5,000 Units, SubCUTAneous, 3 times per day    losartan, 25 mg, Oral, Daily    potassium chloride, 60 mEq, Oral, Daily with breakfast    spironolactone, 25 mg, Oral, Daily    acetaminophen, 1,000 mg, Oral, Daily    [START ON 9/17/2022] vitamin D, 50,000 Units, Oral, Weekly    ferrous sulfate, 325 mg, Oral, Daily with breakfast    latanoprost, 1 drop, Ophthalmic, Nightly    sodium chloride flush, 5-40 mL, IntraVENous, 2 times per day    bisacodyl, 5 mg, Oral, Daily    dexamethasone, 4 mg, Oral, Q6H   Infusions    sodium chloride        Antibiotics   Recent Abx Admin        No antibiotic orders with administrations found. Neurologic Exam:  Mental status: awake and alert and oriented x4    Cranial Nerves:  II: Visual acuity not tested, visual fields full, denies new visual changes / diplopia  III, IV, VI: PERRL, 3 mm bilaterally, EOMI, no nystagmus noted  V: Facial sensation intact bilaterally to touch  VII: Face symmetric  VIII: Hearing intact bilaterally to spoken voice  IX: Palate movement equal bilaterally  XI: Shoulder shrug equal bilaterally  XII: Tongue midline      Musculoskeletal:   Gait: Not tested   Tone: normal  Sensory: intact to all extremities  Motor strength:    Right  Left    Right  Left    Deltoid  5 5  Hip Flex  5 5   Biceps  5 5  Knee Extensors  5 5   Triceps  5 5  Knee Flexors  5 5   Wrist Ext  5 5  Ankle Dorsiflex. 5 5   Wrist Flex  5 5  Ankle Plantarflex. 5 5   Handgrip  5 5  Ext Nish Longus  5 5   Thumb Ext  5 5         Incision: intact, clean and dry    Respiratory:  Unlabored respiratory pattern    Abdomen:   Soft, ND       Cardiovascular:  Warm, well perfused    Assessment   Patient is a 62 yo M s/p Procedure(s) (LRB):  LEFT TEMPORAL CRANIOTOMY FOR RESECTION OF TUMOR WITH IMAGE GUIDANCE (Left) per Dr. Shelia Vieyra. Plan:  Patient improved significantly post-operatively. Eating, voiding and ambulating well.   Pain and nausea well controlled on oral medications  Surgical incision CDI without issues  Discharge home with family  All discharge instructions including dressing changes and follow up care gone over with patient and family and provided in written form     Patient was discussed with  [address] who agrees with above assessment and plan. Electronically signed by: ROSLYN Forte CNP, 9/16/2022 12:18 PM   Neurosurgery Nurse Practitioner  [phone]   I spent 45 minutes in the care of this patient. Over 50% of that time was in face-to-face counseling regarding post op progression, pain management strategies, and answering patient and family questions about discharge instructions  .

## 2022-09-16 NOTE — PROGRESS NOTES
4 Eyes Admission Assessment     I agree as the admission nurse that 2 RN's have performed a thorough Head to Toe Skin Assessment on the patient. ALL assessment sites listed below have been assessed on admission. Areas assessed by both nurses: yes  [x]   Head, Face, and Ears - L Crani incision, drsg intact, no drng  [x]   Shoulders, Back, and Chest  [x]   Arms, Elbows, and Hands   [x]   Coccyx, Sacrum, and Ischum  [x]   Legs, Feet, and Heels        Does the Patient have Skin Breakdown?   No         Giovany Prevention initiated:  Yes   Wound Care Orders initiated:  NA      [facility] nurse consulted for Pressure Injury (Stage 3,4, Unstageable, DTI, NWPT, and Complex wounds):  NA      Nurse 1 eSignature: Electronically signed by Breana Pérez RN on 9/16/22 at 12:56 AM EDT    **SHARE this note so that the co-signing nurse is able to place an eSignature**    Nurse 2 eSignature: Electronically signed by Delfino Cobian RN on 9/16/22 at 2:28 AM EDT

## 2022-09-16 NOTE — ANESTHESIA POSTPROCEDURE EVALUATION
Department of Anesthesiology  Postprocedure Note    Patient: Jennifer Negro  MRN: [id number]  YOB: 1960  Date of evaluation: 9/13/2022      Procedure Summary     Date: 09/13/22 Room / Location: [facility]    Anesthesia Start: 1110 Anesthesia Stop: 1809    Procedure: LEFT TEMPORAL CRANIOTOMY FOR RESECTION OF TUMOR WITH IMAGE GUIDANCE (Left: Head) Diagnosis:       Brain neoplasm ([address]      (Brain neoplasm ([address] [D49.6])    Surgeons: Waleska Del Castillo MD Responsible Provider: Rakan Vasquez DO    Anesthesia Type: general ASA Status: 3          Anesthesia Type: No value filed.     Pelon Phase I: Pelon Score: 10    Pelon Phase II:        Anesthesia Post Evaluation    Patient location during evaluation: PACU  Patient participation: complete - patient participated  Level of consciousness: awake and alert  Airway patency: patent  Nausea & Vomiting: no nausea and no vomiting  Cardiovascular status: blood pressure returned to baseline  Respiratory status: acceptable  Hydration status: euvolemic

## 2022-09-16 NOTE — PROGRESS NOTES
Pt transferred to room 5508 from ICU; pt a/o x4, VSS intact to RA, PIV x2 intact; pt oriented to new room and advised on fall risk status, pt to utilize call light for assistance to get up to the bathroom, pt verbalized understanding. L crani site dressing clean/dry/intact. Fall and safety precautions initiated and maintained.

## 2022-09-16 NOTE — PROGRESS NOTES
VSS on room air. Aox4. Ambulating SBA, denies dizziness. Voiding adequately via BRP, denies pain or difficulty when urinating. Tolerating oral diet and PO fluids, denies N/V. Skin C/D/I with exception to surgical site. Incision C/D/I, no drainage noted, painted with CHG. All fall precautions in place.

## 2022-09-16 NOTE — PLAN OF CARE
Problem: Pain  Goal: Verbalizes/displays adequate comfort level or baseline comfort level  9/16/2022 1330 by Cora Saldivar RN  Outcome: Progressing   Pt denies any pain at this time. Endorsing occasional headache, being treated with tylenol with good relief. Problem: Safety - Adult  Goal: Free from fall injury  Outcome: Progressing   All fall precautions in place. Bed locked and in lowest position with alarm on. Overbed table and personal belonings within reach. Call light within reach and patient instructed to use call light for assistance. Non-skid socks on.

## 2022-09-16 NOTE — PROGRESS NOTES
Interventional cardiology progress note  Patient had craniotomy and surgery on couple days ago and is clinically better and back to apparently his baseline. We were asked to see him because of a 4 beat run of V. tach and bradycardia. During the course of our follow-up in house there has been no more episodes of V. tach and no bradycardia below 55 bpm.  I do not see any actionable lesions at this time. I do think we should monitor the patient and have asked our nurse navigator to place a ZIO on him for 2 weeks. We will be happy to follow him as an outpatient.   Joes De Paz MD, [facility] [place]

## 2022-09-16 NOTE — DISCHARGE INSTRUCTIONS
Patient instructions for CAM monitor: You will need to wear monitor for 2 weeks. Mail monitor back or return to office on following date. Remove date:  9-30-22      [address]  [address] [facility], [address]         Make sure to save box as you will place monitor back in postage paid box to return to company. After removing monitor stick it to template provided, place both your log and monitor in box and place in mailbox. If monitor comes off but has been in place at least 7 days place in box and mail back. If it comes off sooner than 7 days you will have to call office and return to have it replaced. Avoid excess sweating to maximize wear time. You are able to shower after 24 hours, however have majority of water hitting back and not directly on monitor. Do not submerge in bath. If you experience any symptoms while wearing monitor push button and record in booklet.       For questions about monitor call: Customer Service [phone]

## 2022-09-16 NOTE — DISCHARGE SUMMARY
Discharge Summary    Date of Admission: 9/13/2022  8:58 AM  Date of Discharge: 9/16/22  Admission Diagnosis: Brain neoplasm [facility]) [D49.6]  Discharge Diagnosis: Same   Condition on Discharge: good  Attending for Admission: Dennie Escort, MD  Procedures: Procedure(s) (LRB):  LEFT TEMPORAL CRANIOTOMY FOR RESECTION OF TUMOR WITH IMAGE GUIDANCE (Left)  Consults: IP CONSULT TO PHARMACY  IP CONSULT TO CARDIOLOGY  IP CONSULT TO HOME CARE NEEDS  IP CONSULT TO SOCIAL WORK    Reason for Admission:  Quyen Barnes is a 63 y.o. male patient who was admitted to the hospital for removal of meningioma. He underwent the procedure listed above on 9/13/22. Hospital Course:  After surgery, He complained of slight pain in head. The pain was well-controlled on oral medications. The incision was clean, dry and intact. There was no erythema or edema around the surgical site. Prior to discharge He was eating well, urinating and ambulating with a steady gait. Discharge Vitals/Labs:  /73   Pulse 58   Temp 97.5 °F (36.4 °C) (Oral)   Resp 12   Ht 6' (1.829 m)   Wt 279 lb 9.6 oz (126.8 kg)   SpO2 96%   BMI 37.92 kg/m²   CBC:   Lab Results   Component Value Date/Time    WBC 17.7 09/16/2022 06:26 AM    RBC 4.31 09/16/2022 06:26 AM    HGB 12.1 09/16/2022 06:26 AM    HCT 36.7 09/16/2022 06:26 AM    MCV 85.1 09/16/2022 06:26 AM    MCH 28.0 09/16/2022 06:26 AM    MCHC 32.9 09/16/2022 06:26 AM    RDW 14.0 09/16/2022 06:26 AM     09/16/2022 06:26 AM    MPV 7.8 09/16/2022 06:26 AM     BMP:    Lab Results   Component Value Date/Time     09/16/2022 06:26 AM    K 3.9 09/16/2022 06:26 AM    CL 99 09/16/2022 06:26 AM    CO2 28 09/16/2022 06:26 AM    BUN 13 09/16/2022 06:26 AM    CREATININE 0.7 09/16/2022 06:26 AM    CALCIUM 8.1 09/16/2022 06:26 AM    GFRAA >60 09/16/2022 06:26 AM    LABGLOM >60 09/16/2022 06:26 AM    GLUCOSE 137 09/16/2022 06:26 AM       Discharge Medications:   The patient suffers from a major neurological surgery that pain cannot be managed within an average of 30 MED per day. Severe acute postoperative pain is the reason for exceeding the 30 MED average, and the prescription reflects the same dosage patient received while inpatient, which is the lowest dose consistent with the patients medical condition. Non-opioid treatment options have been considered prior to prescribing opioids, and the patient has been advised of the benefits and risks of the opioid (including the potential for addiction). Medication List        START taking these medications      levETIRAcetam 500 MG tablet  Commonly known as: KEPPRA  Take 1 tablet by mouth 2 times daily for 7 days     polyethylene glycol 17 g packet  Commonly known as: GLYCOLAX  Take 17 g by mouth daily  Start taking on: September 17, 2022     sennosides-docusate sodium 8.6-50 MG tablet  Commonly known as: SENOKOT-S  Take 2 tablets by mouth daily as needed for Constipation            CHANGE how you take these medications      dexamethasone 4 MG tablet  Commonly known as: DECADRON  Take 1 tablet by mouth every 8 (eight) hours for 2 days, THEN 1 tablet every 12 hours for 2 days, THEN 1 tablet daily for 2 days. Start taking on: September 16, 2022  What changed: See the new instructions.             CONTINUE taking these medications      acetaminophen 500 MG tablet  Commonly known as: TYLENOL     Azelastine-Fluticasone 137-50 MCG/ACT Susp     cetirizine 10 MG tablet  Commonly known as: ZYRTEC     ergocalciferol 1.25 MG (30405 UT) capsule  Commonly known as: ERGOCALCIFEROL     famotidine 20 MG tablet  Commonly known as: PEPCID     ferrous sulfate 325 (65 Fe) MG tablet  Commonly known as: IRON 325     latanoprost 0.005 % ophthalmic solution  Commonly known as: XALATAN     losartan 25 MG tablet  Commonly known as: COZAAR     melatonin 5 MG Tabs tablet     potassium chloride 20 MEQ packet  Commonly known as: KLOR-CON     sodium chloride 0.65 % nasal spray  Commonly known as: OCEAN     spironolactone 50 MG tablet  Commonly known as: ALDACTONE            STOP taking these medications      Cyanocobalamin ER 1000 MCG Tbcr               Where to Get Your Medications        These medications were sent to [place], [address] [facility] [phone] - F [phone] [address] [facility] [address]      Phone: [phone]   dexamethasone 4 MG tablet  levETIRAcetam 500 MG tablet       You can get these medications from any pharmacy    You don't need a prescription for these medications  polyethylene glycol 17 g packet  sennosides-docusate sodium 8.6-50 MG tablet         Discharge Destination:  The patient was discharged to Home. Follow-up:  The patient is to follow-up with Courtney Zacarias MD in the office in 2 weeks      Discharge Instructions:   Verbal and written discharge instructions were given to the patient at the time of discharge. No NSAIDS or anticoagulation for 1 week post-operatively. No driving or riding in a motor vehicle. No lifting or bending    Electronically signed by:  ROSLYN Elkins CNP, APRN-CNP, 9/16/2022 12:29 PM  [phone]

## 2022-09-28 ENCOUNTER — OFFICE VISIT (OUTPATIENT)
Dept: CARDIOLOGY CLINIC | Age: 62
End: 2022-09-28
Payer: COMMERCIAL

## 2022-09-28 VITALS
DIASTOLIC BLOOD PRESSURE: 60 MMHG | SYSTOLIC BLOOD PRESSURE: 120 MMHG | BODY MASS INDEX: 38.24 KG/M2 | HEART RATE: 104 BPM | WEIGHT: 282 LBS

## 2022-09-28 DIAGNOSIS — Z09 FOLLOW UP: Primary | ICD-10-CM

## 2022-09-28 PROCEDURE — 99214 OFFICE O/P EST MOD 30 MIN: CPT | Performed by: NURSE PRACTITIONER

## 2022-09-28 NOTE — PROGRESS NOTES
[facility]    Cardiology office visit       Primary Cardiologist:Ld     Recently in hosp with  bradycardia after craniotomy   Has cardiac monitor on until Friday   No c/o presyncope or palpitations  Occas lightheadness with recent brain surgery      Staples on left side of head dry & approximated   VSS denies fever  cough or CVA symptoms   c/o headaches at times and take tylenol   Fu in approx 4 weeks      History of Present Illness: Perlita Armenta is a 63 y.o. male is being seen to assess his bradycardia. The patient had recent neurological surgery because of \"tumors\". This is his second or third time a craniotomy over a period of time. He is awake and alert and able to give a significant amount of in history. Denies any chest pain or shortness of breath and no syncop  HTN wnl   HLD wnl LDL   Recurring brain masses surgically treated surgery 9/13/2022   Obesity Body mass index is 38.24 kg/m². Has lost over 100# /eats better / states has been on strict diet       TTE 9/15/2022   Technically difficult study due to patient body habitus. Normal left ventricle size, wall thickness, and systolic function with an estimated  ejection fraction of 55-60%. No regional wall motion abnormalities are seen. Indeterminate diastolic function. There is mild tricuspid  valve regurgitation no stenosis. No pericardial effusion noted. No pleural effusion. IVC size is normal (<2.1cm) and collapses > 50%  with respiration consistent with normal RA pressure (3mmHg). Unable to estimate pulmonary artery pressure secondary to  incomplete TR jet envelope. Past Medical History:   has a past medical history of Brain mass, Cancer ([address] Flashbacks ([address] Glaucoma, Hypertension, Hypokalemia, Kidney anomaly, congenital, Lumbar spinal stenosis, Memory deficit, Osteoarthritis, Prediabetes, and Sinus problem.     Surgical History:   has a past surgical history that includes Colonoscopy; Tonsillectomy and adenoidectomy; sinus surgery; and craniotomy (Left, 9/13/2022). Social History:   reports that he has never smoked. He has never used smokeless tobacco. He reports that he does not drink alcohol. Family History:  family history is not on file. Home Medications:  Prior to Admission medications    Medication Sig Start Date End Date Taking?  Authorizing Provider   levETIRAcetam (KEPPRA) 500 MG tablet Take 1 tablet by mouth 2 times daily for 7 days 9/16/22 9/28/22 Yes Case Hugo, APRN - CNP   polyethylene glycol [facility]) 17 g packet Take 17 g by mouth daily 9/17/22 10/17/22 Yes Case Hugo APRN - CNP   sennosides-docusate sodium (SENOKOT-S) 8.6-50 MG tablet Take 2 tablets by mouth daily as needed for Constipation 9/16/22  Yes ROSLYN Ojeda - CNP   losartan (COZAAR) 25 MG tablet Take 25 mg by mouth daily   Yes Historical Provider, MD   potassium chloride (KLOR-CON) 20 MEQ packet Take 60 mEq by mouth daily Dissolve in low sugar juice   Yes Historical Provider, MD   spironolactone (ALDACTONE) 50 MG tablet Take 50 mg by mouth daily   Yes Historical Provider, MD   melatonin 5 MG TABS tablet Take 5 mg by mouth nightly as needed (insomnia)   Yes Historical Provider, MD   acetaminophen (TYLENOL) 500 MG tablet Take 1,000 mg by mouth daily   Yes Historical Provider, MD   Azelastine-Fluticasone 137-50 MCG/ACT SUSP 1 spray by Each Nostril route in the morning and at bedtime   Yes Historical Provider, MD   cetirizine (ZYRTEC) 10 MG tablet Take 10 mg by mouth daily 6/10/20  Yes Historical Provider, MD   ergocalciferol (ERGOCALCIFEROL) 1.25 MG (83988 UT) capsule Take 50,000 Units by mouth once a week Take on Saturdays   Yes Historical Provider, MD   famotidine (PEPCID) 20 MG tablet Take 20 mg by mouth 2 times daily 9/1/22  Yes Historical Provider, MD   ferrous sulfate (IRON 325) 325 (65 Fe) MG tablet Take 325 mg by mouth daily (with breakfast) 6/27/22  Yes Historical Provider, MD latanoprost (XALATAN) 0.005 % ophthalmic solution Place 1 drop into both eyes nightly   Yes Historical Provider, MD   sodium chloride (OCEAN) 0.65 % nasal spray 1 spray by Nasal route in the morning and at bedtime   Yes Historical Provider, MD   Cyanocobalamin ER 1000 MCG TBCR Take 1 each by mouth daily 5/16/22 9/16/22  Historical Provider, MD        Current Medications:  Review of Systems:   Constitutional: there has been no unanticipated weight loss. Eyes: No visual changes or diplopia. No scleral icterus. ENT: No Headaches, hearing loss or vertigo. No mouth sores or sore throat. Cardiovascular: Reviewed in HPI   Pulmonary:  no cough or sputum production. Gastrointestinal: No abdominal pain, appetite loss, blood in stools. No change in bowel or bladder habits. Genitourinary: No dysuria, trouble voiding, or hematuria. Musculoskeletal:  No gait disturbance, weakness or joint complaints. Integumentary: No rash or pruritis. Endocrine: No malaise, fatigue or temperature intolerance. Hematologic/Lymphatic: No abnormal bruising or bleeding, blood clots or swollen lymph nodes. Allergic/Immunologic: No nasal congestion or hives. All other ROS are reviewed and are unremarkable. Physical Examination:    Vitals:    09/28/22 1030   BP: 120/60   Pulse: (!) 104   Weight: 282 lb (127.9 kg)        EXAMl and General Appearance:  Healthy. And alert . HEENT: eyes and ears intact. No nasal masses  THYROID: not enlarged  LUNGS:  Clear to auscultation and percussion  HEART and VASCULAR:  The apical impulses not displaced  Heart tones are crisp and normal  Cervical veins are not engorged  The carotid upstroke is normal in amplitude and contour without delay or bruit  Peripheral pulses are symmetrical and full  There is no clubbing, cyanosis of the extremities.   No peripheral edema  Femoral Arteries: 2+ and equal  Pedal Pulses:2+ and equal   ABDOMEN[de-identified]  No masses or tenderness  Liver/Spleen: No Abnormalities Noted  NEUROLOGICAL:  . Moves all extremities to command. Cranial nerves 2-12 are in tact. PSYCHIATRIC: alert and lucid  and oriented and appropriate  SKIN: No lesions or rashes  LYMPH NODES: none enlarged    Lab Results   Component Value Date/Time    WBC 17.7 09/16/2022 06:26 AM    RBC 4.31 09/16/2022 06:26 AM    HGB 12.1 09/16/2022 06:26 AM    HCT 36.7 09/16/2022 06:26 AM     09/16/2022 06:26 AM    MCV 85.1 09/16/2022 06:26 AM    MCH 28.0 09/16/2022 06:26 AM    MCHC 32.9 09/16/2022 06:26 AM    RDW 14.0 09/16/2022 06:26 AM    LYMPHOPCT 4.1 09/16/2022 06:26 AM    MONOPCT 6.2 09/16/2022 06:26 AM    BASOPCT 0.0 09/16/2022 06:26 AM    MONOSABS 1.1 09/16/2022 06:26 AM    LYMPHSABS 0.7 09/16/2022 06:26 AM    EOSABS 0.0 09/16/2022 06:26 AM    BASOSABS 0.0 09/16/2022 06:26 AM     BMP:    Lab Results   Component Value Date/Time     09/16/2022 06:26 AM    K 3.9 09/16/2022 06:26 AM    CL 99 09/16/2022 06:26 AM    CO2 28 09/16/2022 06:26 AM    BUN 13 09/16/2022 06:26 AM    CREATININE 0.7 09/16/2022 06:26 AM    CALCIUM 8.1 09/16/2022 06:26 AM    GFRAA >60 09/16/2022 06:26 AM    LABGLOM >60 09/16/2022 06:26 AM     Lab Results   Component Value Date/Time    PROTIME 12.9 09/13/2022 09:25 AM    INR 0.99 09/13/2022 09:25 AM       Assessment:      Recently in hosp with  bradycardia after craniotomy   Has cardiac monitor on until Friday   No c/o presyncope or palpitations  Occas lightheadness with recent brain surgery      Staples on left side of head dry & approximated   VSS denies fever  cough or CVA symptoms   c/o headaches at times and take tylenol   Fu in approx 4 weeks      HTN wnl     HLD wnl LDL     Recurring brain masses surgically treated surgery 9/13/2022     Obesity Body mass index is 38.24 kg/m². Has over 100# /eats better / Milfay Anthony has been on strict diet       TTE 9/15/2022   Technically difficult study due to patient body habitus.  Normal left ventricle size, wall thickness, and systolic function with an estimated  ejection fraction of 55-60%. No regional wall motion abnormalities are seen. Indeterminate diastolic function. There is mild tricuspid  valve regurgitation no stenosis. No pericardial effusion noted. No pleural effusion. IVC size is normal (<2.1cm) and collapses > 50%  with respiration consistent with normal RA pressure (3mmHg). Unable to estimate pulmonary artery pressure secondary to  incomplete TR jet envelope.     Plan    Recently in hosp with  bradycardia after craniotomy   Has cardiac monitor on until Friday   No c/o presyncope or palpitations  Occas lightheadness with recent brain surgery      Staples on left side of head dry & approximated   VSS denies fever  cough or CVA symptoms   c/o headaches at times and take tylenol   Fu in approx 4 weeks  to review is Holter monitor     Jaciel Hernandes, APRN - CNP , [address] [facility]  3/53/875182:29 AM

## 2022-11-04 ENCOUNTER — OFFICE VISIT (OUTPATIENT)
Dept: CARDIOLOGY CLINIC | Age: 62
End: 2022-11-04
Payer: COMMERCIAL

## 2022-11-04 VITALS
WEIGHT: 285 LBS | HEART RATE: 92 BPM | DIASTOLIC BLOOD PRESSURE: 92 MMHG | HEIGHT: 72 IN | SYSTOLIC BLOOD PRESSURE: 132 MMHG | OXYGEN SATURATION: 96 % | BODY MASS INDEX: 38.6 KG/M2

## 2022-11-04 DIAGNOSIS — I47.20 V-TACH: Primary | ICD-10-CM

## 2022-11-04 PROCEDURE — 99214 OFFICE O/P EST MOD 30 MIN: CPT | Performed by: NURSE PRACTITIONER

## 2022-11-04 NOTE — PROGRESS NOTES
[facility]    Cardiology office visit       Primary Cardiologist:Ld     Recently in hosp with  bradycardia after craniotomy 9/2022  PE DVT 10/2022 now on Xarelto   Today we are discussing his recent zio results   No c/o presyncope or palpitations  Occas lightheadness with recent brain surgery      VSS denies fever cough or CVA symptoms   c/o headaches at times and take tylenol   Zio worn NSVT and PACs  HR lowest 44 NSR   VSS wt stable no c/o cp occs \"lung pain with breathing\"      History of Present Illness: Dipti Tobin is a 63 y.o. male is being seen to assess his bradycardia. The patient had recent neurological surgery because of \"tumors\". This is his second or third time a craniotomy over a period of time. He is awake and alert and able to give a significant amount of in history. Denies any chest pain or shortness of breath and no syncop  HTN wnl   HLD wnl LDL   Recurring brain masses surgically treated surgery 9/13/2022   Obesity Body mass index is 38.65 kg/m². Has lost over 100# /eats better / Larraine Servant has been on strict diet   PE DVT 10/2022     TTE 9/15/2022   Technically difficult study due to patient body habitus. Normal left ventricle size, wall thickness, and systolic function with an estimated  ejection fraction of 55-60%. No regional wall motion abnormalities are seen. Indeterminate diastolic function. There is mild tricuspid  valve regurgitation no stenosis. No pericardial effusion noted. No pleural effusion. IVC size is normal (<2.1cm) and collapses > 50%  with respiration consistent with normal RA pressure (3mmHg). Unable to estimate pulmonary artery pressure secondary to  incomplete TR jet envelope.     Past Medical History:   has a past medical history of Brain mass, Cancer ([address] Flashbacks ([address] Glaucoma, Hypertension, Hypokalemia, Kidney anomaly, congenital, Lumbar spinal stenosis, Memory deficit, Osteoarthritis, Prediabetes, and Sinus problem. Surgical History:   has a past surgical history that includes Colonoscopy; Tonsillectomy and adenoidectomy; sinus surgery; and craniotomy (Left, 9/13/2022). Social History:   reports that he has never smoked. He has never used smokeless tobacco. He reports that he does not drink alcohol. Family History:  family history is not on file. Home Medications:  Prior to Admission medications    Medication Sig Start Date End Date Taking?  Authorizing Provider   rivaroxaban (XARELTO) 20 MG TABS tablet Take 20 mg by mouth Daily with supper   Yes Historical Provider, MD   sennosides-docusate sodium (SENOKOT-S) 8.6-50 MG tablet Take 2 tablets by mouth daily as needed for Constipation 9/16/22  Yes ROSLYN Luz - CNP   losartan (COZAAR) 25 MG tablet Take 25 mg by mouth daily   Yes Historical Provider, MD   potassium chloride (KLOR-CON) 20 MEQ packet Take 60 mEq by mouth daily Dissolve in low sugar juice   Yes Historical Provider, MD   spironolactone (ALDACTONE) 50 MG tablet Take 50 mg by mouth daily   Yes Historical Provider, MD   melatonin 5 MG TABS tablet Take 5 mg by mouth nightly as needed (insomnia)   Yes Historical Provider, MD   acetaminophen (TYLENOL) 500 MG tablet Take 1,000 mg by mouth daily   Yes Historical Provider, MD   Azelastine-Fluticasone 137-50 MCG/ACT SUSP 1 spray by Each Nostril route in the morning and at bedtime   Yes Historical Provider, MD   cetirizine (ZYRTEC) 10 MG tablet Take 10 mg by mouth daily 6/10/20  Yes Historical Provider, MD   ergocalciferol (ERGOCALCIFEROL) 1.25 MG (59546 UT) capsule Take 50,000 Units by mouth once a week Take on Saturdays   Yes Historical Provider, MD   famotidine (PEPCID) 20 MG tablet Take 20 mg by mouth 2 times daily 9/1/22  Yes Historical Provider, MD   ferrous sulfate (IRON 325) 325 (65 Fe) MG tablet Take 325 mg by mouth daily (with breakfast) 6/27/22  Yes Historical Provider, MD   latanoprost (XALATAN) 0.005 % ophthalmic solution Place 1 drop into both eyes nightly   Yes Historical Provider, MD   sodium chloride (OCEAN) 0.65 % nasal spray 1 spray by Nasal route in the morning and at bedtime   Yes Historical Provider, MD   levETIRAcetam (KEPPRA) 500 MG tablet Take 1 tablet by mouth 2 times daily for 7 days 9/16/22 9/28/22  Suze Blanc, APRN - CNP   Cyanocobalamin ER 1000 MCG TBCR Take 1 each by mouth daily 5/16/22 9/16/22  Historical Provider, MD        Current Medications:  Review of Systems:   Constitutional: there has been no unanticipated weight loss. Eyes: No visual changes or diplopia. No scleral icterus. ENT: No Headaches, hearing loss or vertigo. No mouth sores or sore throat. Cardiovascular: Reviewed in HPI   Pulmonary:  no cough or sputum production. Gastrointestinal: No abdominal pain, appetite loss, blood in stools. No change in bowel or bladder habits. Genitourinary: No dysuria, trouble voiding, or hematuria. Musculoskeletal:  No gait disturbance, weakness or joint complaints. Integumentary: No rash or pruritis. Endocrine: No malaise, fatigue or temperature intolerance. Hematologic/Lymphatic: No abnormal bruising or bleeding, blood clots or swollen lymph nodes. Allergic/Immunologic: No nasal congestion or hives. All other ROS are reviewed and are unremarkable. Physical Examination:    Vitals:    11/04/22 1100 11/04/22 1108   BP: (!) 132/92 (!) 132/92   Pulse: 92    SpO2: 96%    Weight: 285 lb (129.3 kg)    Height: 6' (1.829 m)         EXAMl and General Appearance:  Healthy. And alert . HEENT: eyes and ears intact. No nasal masses  THYROID: not enlarged  LUNGS:  Clear to auscultation and percussion  HEART and VASCULAR:  The apical impulses not displaced  Heart tones are crisp and normal  Cervical veins are not engorged  The carotid upstroke is normal in amplitude and contour without delay or bruit  Peripheral pulses are symmetrical and full  There is no clubbing, cyanosis of the extremities.   No peripheral edema  Femoral Arteries: 2+ and equal  Pedal Pulses:2+ and equal   ABDOMEN[de-identified]  No masses or tenderness  Liver/Spleen: No Abnormalities Noted  NEUROLOGICAL:  . Moves all extremities to command. Cranial nerves 2-12 are in tact. PSYCHIATRIC: alert and lucid  and oriented and appropriate  SKIN: No lesions or rashes  LYMPH NODES: none enlarged    Lab Results   Component Value Date/Time    WBC 17.7 09/16/2022 06:26 AM    RBC 4.31 09/16/2022 06:26 AM    HGB 12.1 09/16/2022 06:26 AM    HCT 36.7 09/16/2022 06:26 AM     09/16/2022 06:26 AM    MCV 85.1 09/16/2022 06:26 AM    MCH 28.0 09/16/2022 06:26 AM    MCHC 32.9 09/16/2022 06:26 AM    RDW 14.0 09/16/2022 06:26 AM    LYMPHOPCT 4.1 09/16/2022 06:26 AM    MONOPCT 6.2 09/16/2022 06:26 AM    BASOPCT 0.0 09/16/2022 06:26 AM    MONOSABS 1.1 09/16/2022 06:26 AM    LYMPHSABS 0.7 09/16/2022 06:26 AM    EOSABS 0.0 09/16/2022 06:26 AM    BASOSABS 0.0 09/16/2022 06:26 AM     BMP:    Lab Results   Component Value Date/Time     09/16/2022 06:26 AM    K 3.9 09/16/2022 06:26 AM    CL 99 09/16/2022 06:26 AM    CO2 28 09/16/2022 06:26 AM    BUN 13 09/16/2022 06:26 AM    CREATININE 0.7 09/16/2022 06:26 AM    CALCIUM 8.1 09/16/2022 06:26 AM    GFRAA >60 09/16/2022 06:26 AM    LABGLOM >60 09/16/2022 06:26 AM     Lab Results   Component Value Date/Time    PROTIME 12.9 09/13/2022 09:25 AM    INR 0.99 09/13/2022 09:25 AM       Assessment:      PE/ DVT now on Xarelto  No c/o bleeding / falls   No NSAIDS only Tylenol     10/3/2022 PE   Small pulmonary emboli in the periphery of the right lower lobe. No infiltrate, no evidence of right ventricular strain. 104/2022 [facility] DVT  No evidence of acute deep vein thrombosis in the right lower extremity. o The evaluation is positive for the presence of acute deep vein thrombosis        in the left lower extremity , as described above.      o Calf vein visualization is limited in the right and left lower        extremities .       o Bilateral soft tissue edema noted. Recently in hosp with  bradycardia after craniotomy   Has cardiac monitor on until Friday   No c/o presyncope or palpitations  Occas lightheadness with recent brain surgery      Staples on left side of head dry & approximated   VSS denies fever  cough or CVA symptoms   c/o headaches at times and take tylenol   Fu in approx 4 weeks      HTN wnl     HLD wnl LDL     Recurring brain masses surgically treated surgery 9/13/2022 9/14/2022 MRI brain   1. Expected postoperative changes with susceptibility artifact and small extra cranial fluid collection along the craniotomy site in the left temporal region. No acute complication or significant residual mass identified. Attention to this region on    follow-up MRI in 3-6 months recommended. 2. Stable FLAIR signal abnormality and mass effect from the resected mass in the left temporal region with stable left-to-right midline shift       Obesity Body mass index is 38.24 kg/m². Has over 100# /eats better / Richard Mitchell has been on strict diet       TTE 9/15/2022   Technically difficult study due to patient body habitus. Normal left ventricle size, wall thickness, and systolic function with an estimated  ejection fraction of 55-60%. No regional wall motion abnormalities are seen. Indeterminate diastolic function. There is mild tricuspid  valve regurgitation no stenosis. No pericardial effusion noted. No pleural effusion. IVC size is normal (<2.1cm) and collapses > 50%  with respiration consistent with normal RA pressure (3mmHg). Unable to estimate pulmonary artery pressure secondary to  incomplete TR jet envelope.     Plan    Recently in hosp with  bradycardia after craniotomy   Has cardiac monitor on until Friday   No c/o presyncope or palpitations  Occas lightheadness with recent brain surgery      left side of head dry & approximated   VSS denies fever  cough or CVA symptoms   c/o headaches at times and take tylenol   Zio worn NSVT and PACs  HR lowest 44 NSR   VSS wt stable no c/o cp occs \"lung pain with breathing\"      CT[facility] cardiac at [facility] d/t  runs VT / bradycardia / occas SOB   Fu in 2-3 weeks     ROSLYN Romano - CNP , [facility]  11/4/202211:22 AM

## 2022-11-07 DIAGNOSIS — I47.20 V-TACH: Primary | ICD-10-CM

## 2022-11-07 PROCEDURE — 93248 EXT ECG>7D<15D REV&INTERPJ: CPT | Performed by: INTERNAL MEDICINE

## 2022-11-10 ENCOUNTER — TELEPHONE (OUTPATIENT)
Dept: CARDIOLOGY CLINIC | Age: 62
End: 2022-11-10

## 2022-11-10 NOTE — TELEPHONE ENCOUNTER
Called and spoke to patient and he stated that he had some chest pain that made him have to grab his chest, I offered the patient an appointment and he stated that he would keep us posted on the situation. I recommended patient go to the ED if the chest pain get's worse.

## 2022-11-10 NOTE — TELEPHONE ENCOUNTER
Patient called and said that he experience some chest pain that lasted for about 5 seconds but was concerned because he had to grab the left side of his chest. Please advise [phone]

## 2022-11-11 ENCOUNTER — HOSPITAL ENCOUNTER (OUTPATIENT)
Dept: CT IMAGING | Age: 62
Discharge: HOME | End: 2022-11-11
Payer: COMMERCIAL

## 2022-11-11 VITALS — HEART RATE: 57 BPM | DIASTOLIC BLOOD PRESSURE: 80 MMHG | OXYGEN SATURATION: 94 % | SYSTOLIC BLOOD PRESSURE: 124 MMHG

## 2022-11-11 DIAGNOSIS — I47.20 V-TACH: ICD-10-CM

## 2022-11-11 LAB
GFR SERPL CREATININE-BSD FRML MDRD: >60 ML/MIN/{1.73_M2}
PERFORMED ON: NORMAL
POC CREATININE: 0.9 MG/DL (ref 0.8–1.3)
POC SAMPLE TYPE: NORMAL

## 2022-11-11 PROCEDURE — 82565 ASSAY OF CREATININE: CPT

## 2022-11-11 PROCEDURE — 75574 CT ANGIO HRT W/3D IMAGE: CPT

## 2022-11-11 PROCEDURE — 2500000003 HC RX 250 WO HCPCS: Performed by: RADIOLOGY

## 2022-11-11 PROCEDURE — 6360000004 HC RX CONTRAST MEDICATION: Performed by: FAMILY MEDICINE

## 2022-11-11 PROCEDURE — 6370000000 HC RX 637 (ALT 250 FOR IP): Performed by: RADIOLOGY

## 2022-11-11 RX ORDER — METOPROLOL TARTRATE 5 MG/5ML
5 INJECTION INTRAVENOUS ONCE
Status: COMPLETED | OUTPATIENT
Start: 2022-11-11 | End: 2022-11-11

## 2022-11-11 RX ORDER — NITROGLYCERIN 0.4 MG/1
0.4 TABLET SUBLINGUAL ONCE
Status: COMPLETED | OUTPATIENT
Start: 2022-11-11 | End: 2022-11-11

## 2022-11-11 RX ADMIN — IOPAMIDOL 80 ML: 755 INJECTION, SOLUTION INTRAVENOUS at 13:11

## 2022-11-11 RX ADMIN — METOPROLOL TARTRATE 5 MG: 5 INJECTION INTRAVENOUS at 13:00

## 2022-11-11 RX ADMIN — METOPROLOL TARTRATE 5 MG: 5 INJECTION INTRAVENOUS at 12:45

## 2022-11-11 RX ADMIN — METOPROLOL TARTRATE 5 MG: 5 INJECTION INTRAVENOUS at 12:39

## 2022-11-11 RX ADMIN — NITROGLYCERIN 0.4 MG: 0.4 TABLET SUBLINGUAL at 12:54

## 2022-11-11 NOTE — PROGRESS NOTES
Pt here for Cardiac CTA test.  Administered 15 mg of metoprolol to achieve desired heart rate of 60 BPM.  Administered 0.4mg nitroglycerin sublingual for exam.  Pt tolerated well. VSS. See flow sheet. Pt transferred to cath lab holding [address] .

## 2022-11-11 NOTE — DISCHARGE INSTRUCTIONS
[facility] You for choosing [facility]. for your medical care. During your examination, you were given a Beta Blocker called Metopropol or Lopressor to help slow down your heart rate. The dose of the medication you were given was ___metropolol 15 mg______ and 1 sublingual nitroglycerin. Although there are few side effects from this medication when given in small amounts (doses) it is important you follow a few important instructions:    Notify the doctor that ordered your test or go to the nearest emergency room if you experience any of the following:  difficulty breathing  dizziness  extreme fatigue  pounding or irregular heart beat    Continue your usual diet, increase fluids today. (Four 8 ounce glasses of water). 4.You may return back to your normal activity and routine tomorrow. Test results will be sent to your Physician.     If you take Actoplus Met, Avandamet, Glucophage, Glucophage XR, Glucovance, Metformin, Metaglip, Riomet, or Fortmet hold medication for 48 hours after procedure and resume

## 2022-11-28 ENCOUNTER — OFFICE VISIT (OUTPATIENT)
Dept: CARDIOLOGY CLINIC | Age: 62
End: 2022-11-28
Payer: COMMERCIAL

## 2022-11-28 VITALS
WEIGHT: 289 LBS | SYSTOLIC BLOOD PRESSURE: 108 MMHG | BODY MASS INDEX: 39.2 KG/M2 | DIASTOLIC BLOOD PRESSURE: 62 MMHG | HEART RATE: 60 BPM

## 2022-11-28 DIAGNOSIS — Z98.890 S/P CRANIOTOMY: Primary | ICD-10-CM

## 2022-11-28 PROCEDURE — 99214 OFFICE O/P EST MOD 30 MIN: CPT | Performed by: NURSE PRACTITIONER

## 2022-11-28 NOTE — PROGRESS NOTES
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 male is being seen to assess his bradycardia. The patient had recent neurological surgery because of \"tumors\". This is his second or third time a craniotomy over a period of time. He is awake and alert and able to give a significant amount of in history. Denies any chest pain or shortness of breath and no syncop  HTN wnl   HLD wnl LDL   Recurring brain masses surgically treated surgery 9/13/2022   Obesity Body mass index is 39.2 kg/m². Has lost over 100# /eats better / Sharri Francois has been on strict diet   PE DVT 10/2022     11/14/2022 CT cardiac   1. There is no evidence of coronary arterial plaque or stenosis. Findings correspond to CAD-RADS category 0.   2. Coronary calcium score is 0.   3. Codominant coronary anatomy with right coronary artery supplying the posterior descending artery and left circumflex artery supplying the left ventricular posterolateral branch. 4. Stable, partially imaged cyst in the right hepatic lobe. 5. Pulmonary emboli noted on the prior CT scan from 10/3/2022 are not visualized currently       TTE 9/15/2022   Technically difficult study due to patient body habitus. Normal left ventricle size, wall thickness, and systolic function with an estimated  ejection fraction of 55-60%. No regional wall motion abnormalities are seen.  Indeterminate diastolic function. There is mild tricuspid  valve regurgitation no stenosis. No pericardial effusion noted. No pleural effusion. IVC size is normal (<2.1cm) and collapses > 50%  with respiration consistent with normal RA pressure (3mmHg). Unable to estimate pulmonary artery pressure secondary to  incomplete TR jet envelope. Past Medical History:   has a past medical history of Brain mass, Cancer ([address] Flashbacks ([address] Glaucoma, Hypertension, Hypokalemia, Kidney anomaly, congenital, Lumbar spinal stenosis, Memory deficit, Osteoarthritis, Prediabetes, and Sinus problem. Surgical History:   has a past surgical history that includes Colonoscopy; Tonsillectomy and adenoidectomy; sinus surgery; and craniotomy (Left, 9/13/2022). Social History:   reports that he has never smoked. He has never used smokeless tobacco. He reports that he does not drink alcohol. Family History:  family history is not on file. Home Medications:  Prior to Admission medications    Medication Sig Start Date End Date Taking?  Authorizing Provider   rivaroxaban (XARELTO) 20 MG TABS tablet Take 20 mg by mouth Daily with supper   Yes Historical Provider, MD   sennosides-docusate sodium (SENOKOT-S) 8.6-50 MG tablet Take 2 tablets by mouth daily as needed for Constipation 9/16/22  Yes ROSLYN Siddiqi - CNP   losartan (COZAAR) 25 MG tablet Take 25 mg by mouth daily   Yes Historical Provider, MD   potassium chloride (KLOR-CON) 20 MEQ packet Take 60 mEq by mouth daily Dissolve in low sugar juice   Yes Historical Provider, MD   spironolactone (ALDACTONE) 50 MG tablet Take 50 mg by mouth daily   Yes Historical Provider, MD   melatonin 5 MG TABS tablet Take 5 mg by mouth nightly as needed (insomnia)   Yes Historical Provider, MD   acetaminophen (TYLENOL) 500 MG tablet Take 1,000 mg by mouth daily   Yes Historical Provider, MD   Azelastine-Fluticasone 137-50 MCG/ACT SUSP 1 spray by Each Nostril route in the morning and at bedtime Yes Historical Provider, MD   cetirizine (ZYRTEC) 10 MG tablet Take 10 mg by mouth daily 6/10/20  Yes Historical Provider, MD   ergocalciferol (ERGOCALCIFEROL) 1.25 MG (25307 UT) capsule Take 50,000 Units by mouth once a week Take on Saturdays   Yes Historical Provider, MD   famotidine (PEPCID) 20 MG tablet Take 20 mg by mouth 2 times daily 9/1/22  Yes Historical Provider, MD   ferrous sulfate (IRON 325) 325 (65 Fe) MG tablet Take 325 mg by mouth daily (with breakfast) 6/27/22  Yes Historical Provider, MD   latanoprost (XALATAN) 0.005 % ophthalmic solution Place 1 drop into both eyes nightly   Yes Historical Provider, MD   sodium chloride (OCEAN) 0.65 % nasal spray 1 spray by Nasal route in the morning and at bedtime   Yes Historical Provider, MD   levETIRAcetam (KEPPRA) 500 MG tablet Take 1 tablet by mouth 2 times daily for 7 days 9/16/22 9/28/22  ROSLYN Ivan - CNP   Cyanocobalamin ER 1000 MCG TBCR Take 1 each by mouth daily 5/16/22 9/16/22  Historical Provider, MD        Current Medications:  Review of Systems:   Constitutional: there has been no unanticipated weight loss. Eyes: No visual changes or diplopia. No scleral icterus. ENT: No Headaches, hearing loss or vertigo. No mouth sores or sore throat. Cardiovascular: Reviewed in HPI   Pulmonary:  no cough or sputum production. Gastrointestinal: No abdominal pain, appetite loss, blood in stools. No change in bowel or bladder habits. Genitourinary: No dysuria, trouble voiding, or hematuria. Musculoskeletal:  No gait disturbance, weakness or joint complaints. Integumentary: No rash or pruritis. Endocrine: No malaise, fatigue or temperature intolerance. Hematologic/Lymphatic: No abnormal bruising or bleeding, blood clots or swollen lymph nodes. Allergic/Immunologic: No nasal congestion or hives. All other ROS are reviewed and are unremarkable.     Physical Examination:    Vitals:    11/28/22 1106   BP: 108/62   Pulse: 60   Weight: 289 lb (131.1 kg)        EXAMl and General Appearance:  Healthy. And alert . HEENT: eyes and ears intact. No nasal masses  THYROID: not enlarged  LUNGS:  Clear to auscultation and percussion  HEART and VASCULAR:  The apical impulses not displaced  Heart tones are crisp and normal  Cervical veins are not engorged  The carotid upstroke is normal in amplitude and contour without delay or bruit  Peripheral pulses are symmetrical and full  There is no clubbing, cyanosis of the extremities. No peripheral edema  Femoral Arteries: 2+ and equal  Pedal Pulses:2+ and equal   ABDOMEN[de-identified]  No masses or tenderness  Liver/Spleen: No Abnormalities Noted  NEUROLOGICAL:  . Moves all extremities to command. Cranial nerves 2-12 are in tact.   PSYCHIATRIC: alert and lucid  and oriented and appropriate  SKIN: No lesions or rashes  LYMPH NODES: none enlarged    Lab Results   Component Value Date/Time    WBC 17.7 09/16/2022 06:26 AM    RBC 4.31 09/16/2022 06:26 AM    HGB 12.1 09/16/2022 06:26 AM    HCT 36.7 09/16/2022 06:26 AM     09/16/2022 06:26 AM    MCV 85.1 09/16/2022 06:26 AM    MCH 28.0 09/16/2022 06:26 AM    MCHC 32.9 09/16/2022 06:26 AM    RDW 14.0 09/16/2022 06:26 AM    LYMPHOPCT 4.1 09/16/2022 06:26 AM    MONOPCT 6.2 09/16/2022 06:26 AM    BASOPCT 0.0 09/16/2022 06:26 AM    MONOSABS 1.1 09/16/2022 06:26 AM    LYMPHSABS 0.7 09/16/2022 06:26 AM    EOSABS 0.0 09/16/2022 06:26 AM    BASOSABS 0.0 09/16/2022 06:26 AM     BMP:    Lab Results   Component Value Date/Time     09/16/2022 06:26 AM    K 3.9 09/16/2022 06:26 AM    CL 99 09/16/2022 06:26 AM    CO2 28 09/16/2022 06:26 AM    BUN 13 09/16/2022 06:26 AM    CREATININE 0.9 11/11/2022 12:27 PM    CREATININE 0.7 09/16/2022 06:26 AM    CALCIUM 8.1 09/16/2022 06:26 AM    GFRAA >60 09/16/2022 06:26 AM    LABGLOM >60 11/11/2022 12:27 PM     Lab Results   Component Value Date/Time    PROTIME 12.9 09/13/2022 09:25 AM    INR 0.99 09/13/2022 09:25 AM       Assessment:      PE/ DVT now on Xarelto / states reviewing his xarelto with his dr / ct  cardiac is neg for clot  11/14/2022  No c/o bleeding / one  fall    No NSAIDS only Tylenol     11/14/2022 CT cardiac   1. There is no evidence of coronary arterial plaque or stenosis. Findings correspond to CAD-RADS category 0.   2. Coronary calcium score is 0.   3. Codominant coronary anatomy with right coronary artery supplying the posterior descending artery and left circumflex artery supplying the left ventricular posterolateral branch. 4. Stable, partially imaged cyst in the right hepatic lobe. 5. Pulmonary emboli noted on the prior CT scan from 10/3/2022 are not visualized currently       10/3/2022 PE   Small pulmonary emboli in the periphery of the right lower lobe. No infiltrate, no evidence of right ventricular strain. 104/2022 [facility] DVT  No evidence of acute deep vein thrombosis in the right lower extremity. o The evaluation is positive for the presence of acute deep vein thrombosis        in the left lower extremity , as described above.      o Calf vein visualization is limited in the right and left lower        extremities . o Bilateral soft tissue edema noted. bradycardia after craniotomy   No c/o presyncope or palpitations  Occas lightheadness with recent brain surgery      Staples on left side of head dry & approximated   VSS denies fever  cough or CVA symptoms   c/o headaches at times and take tylenol       HTN wnl     HLD wnl LDL     Recurring brain masses surgically treated surgery 9/13/2022 9/14/2022 MRI brain   1. Expected postoperative changes with susceptibility artifact and small extra cranial fluid collection along the craniotomy site in the left temporal region. No acute complication or significant residual mass identified. Attention to this region on    follow-up MRI in 3-6 months recommended.    2. Stable FLAIR signal abnormality and mass effect from the resected mass in the left temporal region with stable left-to-right midline shift       Obesity Body mass index is 38.24 kg/m². Has over 100# /eats better / Swati Gerald has been on strict diet       TTE 9/15/2022   Technically difficult study due to patient body habitus. Normal left ventricle size, wall thickness, and systolic function with an estimated  ejection fraction of 55-60%. No regional wall motion abnormalities are seen. Indeterminate diastolic function. There is mild tricuspid  valve regurgitation no stenosis. No pericardial effusion noted. No pleural effusion. IVC size is normal (<2.1cm) and collapses > 50%  with respiration consistent with normal RA pressure (3mmHg). Unable to estimate pulmonary artery pressure secondary to  incomplete TR jet envelope.     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  / Zio worn NSVT and PACs  HR lowest 44 NSR   No c/o presyncope or palpitations  Occas lightheadness with recent brain surgery      Had one fall with dizziness & sinus infection now on abx per PCP  VSS denies fever  cough or CVA symptoms   c/o headaches at times and take tylenol   Zio worn NSVT and PACs  HR lowest 44 NSR  in Sept   Fu in 3 months with blood work   CTA cardiac neg for CAD d/t  runs VT / bradycardia / occas SOB   PE/ DVT now on Xarelto / states reviewing his xarelto with his dr / ct  cardiac reveals neg for clot  11/14/2022 and normal ROSLYN Stapleton - CNP , [facility]  11/28/202211:55 AM

## 2022-12-02 ENCOUNTER — TELEPHONE (OUTPATIENT)
Dept: CARDIOLOGY CLINIC | Age: 62
End: 2022-12-02

## 2022-12-02 NOTE — TELEPHONE ENCOUNTER
Patient called and inquired about receiving another summary report that was giving to him on 11/28/22. Patient stated that the insurance company needs to see everything pertaining to results on him. Patient provided the fax number of [facility] and the  is Denis Bauer [phone]. Patient wants a call back at [phone].

## 2022-12-02 NOTE — TELEPHONE ENCOUNTER
Spoke to patient and will fax his latest office visit, cta angio and holter monitor results to Meredith at [address] at [phone].

## 2022-12-19 ENCOUNTER — TELEPHONE (OUTPATIENT)
Dept: CARDIOLOGY CLINIC | Age: 62
End: 2022-12-19

## 2022-12-19 NOTE — TELEPHONE ENCOUNTER
The patient called to inform Oli Nickerson NP that he's been having SOB 3 to 4 times a week, but he has an appointment with pulmonology in January, as recommended by Oli Nickerson. The patient declined an appointment and just wanted to give Oli Nickerson an update. The patient can be reached at [phone] with any further questions.

## 2023-01-10 ENCOUNTER — OFFICE VISIT (OUTPATIENT)
Dept: CARDIOLOGY CLINIC | Age: 63
End: 2023-01-10
Payer: COMMERCIAL

## 2023-01-10 VITALS
WEIGHT: 296 LBS | DIASTOLIC BLOOD PRESSURE: 74 MMHG | HEART RATE: 72 BPM | SYSTOLIC BLOOD PRESSURE: 94 MMHG | BODY MASS INDEX: 40.14 KG/M2

## 2023-01-10 DIAGNOSIS — R00.2 PALPITATIONS: ICD-10-CM

## 2023-01-10 DIAGNOSIS — Z98.890 S/P CRANIOTOMY: ICD-10-CM

## 2023-01-10 DIAGNOSIS — R06.02 SOB (SHORTNESS OF BREATH): Primary | ICD-10-CM

## 2023-01-10 DIAGNOSIS — D49.6 BRAIN TUMOR (HCC): ICD-10-CM

## 2023-01-10 PROCEDURE — 99214 OFFICE O/P EST MOD 30 MIN: CPT | Performed by: NURSE PRACTITIONER

## 2023-01-10 PROCEDURE — 93246 EXT ECG>7D<15D RECORDING: CPT | Performed by: INTERNAL MEDICINE

## 2023-01-10 PROCEDURE — 93248 EXT ECG>7D<15D REV&INTERPJ: CPT | Performed by: INTERNAL MEDICINE

## 2023-01-10 NOTE — PATIENT INSTRUCTIONS
TTE / Holter for 2 weeks / blood work   Fu in 6-8 weeks   If you haven't had a visit with Dr. Saad Blair in a year please make your next visit with him.

## 2023-01-10 NOTE — PROGRESS NOTES
[facility]    Cardiology office visit       Primary Cardiologist:Ld     Interval hx and today  c/o flutter in his chest twice last night lasting seconds, no presyncope or syncope  b/p is wax and waning went to his PCP  today / following with neuro also   Theola Locus  / h/as  with recent brain surgery      denies fever cough or CVA symptoms   c/o headaches at times and take tylenol   Zio worn NSVT and PACs  HR lowest 44 NSR  11/2022   c/o off balance a times and has sinus infection /states  had a fall in his room and went to his PCP and doing ok today   On abx for sinus infection / improved   Cor CT neg for CAD 11/2022  On xarelto for DVT / PE denies noted bleeding / feels dizzy occas discussed falls and note any bleeding / no c/o seizures    TTE / Holter for 2 weeks / blood work     History of Present Illness: Godfrey Torres is a 63 y.o. male is being seen to assess his bradycardia. The patient had recent neurological surgery because of \"tumors\". This is his second or third time a craniotomy over a period of time. He is awake and alert and able to give a significant amount of in history. Denies any chest pain or shortness of breath and no syncop  HTN wax and wanes    HLD wnl LDL   Recurring brain masses surgically treated surgery 9/13/2022   Obesity Body mass index is 40.14 kg/m². Has lost over 100# /eats better / [place] has been on strict diet   PE DVT 10/2022     11/14/2022 CT cardiac   1. There is no evidence of coronary arterial plaque or stenosis. Findings correspond to CAD-RADS category 0.   2. Coronary calcium score is 0.   3. Codominant coronary anatomy with right coronary artery supplying the posterior descending artery and left circumflex artery supplying the left ventricular posterolateral branch. 4. Stable, partially imaged cyst in the right hepatic lobe.    5. Pulmonary emboli noted on the prior CT scan from 10/3/2022 are not visualized currently       TTE 9/15/2022   Technically difficult study due to patient body habitus. Normal left ventricle size, wall thickness, and systolic function with an estimated  ejection fraction of 55-60%. No regional wall motion abnormalities are seen. Indeterminate diastolic function. There is mild tricuspid  valve regurgitation no stenosis. No pericardial effusion noted. No pleural effusion. IVC size is normal (<2.1cm) and collapses > 50%  with respiration consistent with normal RA pressure (3mmHg). Unable to estimate pulmonary artery pressure secondary to  incomplete TR jet envelope. Past Medical History:   has a past medical history of Brain mass, Cancer ([address] Flashbacks ([address] Glaucoma, Hypertension, Hypokalemia, Kidney anomaly, congenital, Lumbar spinal stenosis, Memory deficit, Osteoarthritis, Prediabetes, and Sinus problem. Surgical History:   has a past surgical history that includes Colonoscopy; Tonsillectomy and adenoidectomy; sinus surgery; and craniotomy (Left, 9/13/2022). Social History:   reports that he has never smoked. He has never used smokeless tobacco. He reports that he does not drink alcohol. Family History:  family history is not on file. Home Medications:  Prior to Admission medications    Medication Sig Start Date End Date Taking?  Authorizing Provider   rivaroxaban (XARELTO) 20 MG TABS tablet Take 20 mg by mouth Daily with supper   Yes Historical Provider, MD   sennosides-docusate sodium (SENOKOT-S) 8.6-50 MG tablet Take 2 tablets by mouth daily as needed for Constipation 9/16/22  Yes Vernestine Ar, APRN - CNP   losartan (COZAAR) 25 MG tablet Take 25 mg by mouth daily   Yes Historical Provider, MD   potassium chloride (KLOR-CON) 20 MEQ packet Take 60 mEq by mouth daily Dissolve in low sugar juice   Yes Historical Provider, MD   spironolactone (ALDACTONE) 50 MG tablet Take 50 mg by mouth daily   Yes Historical Provider, MD   melatonin 5 MG TABS tablet Take 5 mg by mouth nightly as needed (insomnia)   Yes Historical Provider, MD   acetaminophen (TYLENOL) 500 MG tablet Take 1,000 mg by mouth daily   Yes Historical Provider, MD   Azelastine-Fluticasone 137-50 MCG/ACT SUSP 1 spray by Each Nostril route in the morning and at bedtime   Yes Historical Provider, MD   cetirizine (ZYRTEC) 10 MG tablet Take 10 mg by mouth daily 6/10/20  Yes Historical Provider, MD   ergocalciferol (ERGOCALCIFEROL) 1.25 MG (10762 UT) capsule Take 50,000 Units by mouth once a week Take on Saturdays   Yes Historical Provider, MD   famotidine (PEPCID) 20 MG tablet Take 20 mg by mouth 2 times daily 9/1/22  Yes Historical Provider, MD   ferrous sulfate (IRON 325) 325 (65 Fe) MG tablet Take 325 mg by mouth daily (with breakfast) 6/27/22  Yes Historical Provider, MD   latanoprost (XALATAN) 0.005 % ophthalmic solution Place 1 drop into both eyes nightly   Yes Historical Provider, MD   sodium chloride (OCEAN) 0.65 % nasal spray 1 spray by Nasal route in the morning and at bedtime   Yes Historical Provider, MD   levETIRAcetam (KEPPRA) 500 MG tablet Take 1 tablet by mouth 2 times daily for 7 days 9/16/22 9/28/22  ROSLYN Vilchis - CNP   Cyanocobalamin ER 1000 MCG TBCR Take 1 each by mouth daily 5/16/22 9/16/22  Historical Provider, MD        Current Medications:  Review of Systems:   Constitutional: there has been no unanticipated weight loss. Eyes: No visual changes or diplopia. No scleral icterus. ENT: No Headaches, hearing loss or vertigo. No mouth sores or sore throat. Cardiovascular: Reviewed in HPI   Pulmonary:  no cough or sputum production. Gastrointestinal: No abdominal pain, appetite loss, blood in stools. No change in bowel or bladder habits. Genitourinary: No dysuria, trouble voiding, or hematuria. Musculoskeletal:  No gait disturbance, weakness or joint complaints. Integumentary: No rash or pruritis. Endocrine: No malaise, fatigue or temperature intolerance.  Hematologic/Lymphatic: No abnormal bruising or bleeding, blood clots or swollen lymph nodes. Allergic/Immunologic: No nasal congestion or hives. All other ROS are reviewed and are unremarkable. Physical Examination:    Vitals:    01/10/23 1431   BP: 94/74   Pulse: 72   Weight: 296 lb (134.3 kg)        EXAMl and General Appearance:  Healthy. And alert . HEENT: eyes and ears intact. No nasal masses  THYROID: not enlarged  LUNGS:  Clear to auscultation and percussion  HEART and VASCULAR:  The apical impulses not displaced  Heart tones are crisp and normal  Cervical veins are not engorged  The carotid upstroke is normal in amplitude and contour without delay or bruit  Peripheral pulses are symmetrical and full  There is no clubbing, cyanosis of the extremities. No peripheral edema  Femoral Arteries: 2+ and equal  Pedal Pulses:2+ and equal   ABDOMEN[de-identified]  No masses or tenderness  Liver/Spleen: No Abnormalities Noted  NEUROLOGICAL:  . Moves all extremities to command. Cranial nerves 2-12 are in tact.   PSYCHIATRIC: alert and lucid  and oriented and appropriate  SKIN: No lesions or rashes  LYMPH NODES: none enlarged    Lab Results   Component Value Date/Time    WBC 17.7 09/16/2022 06:26 AM    RBC 4.31 09/16/2022 06:26 AM    HGB 12.1 09/16/2022 06:26 AM    HCT 36.7 09/16/2022 06:26 AM     09/16/2022 06:26 AM    MCV 85.1 09/16/2022 06:26 AM    MCH 28.0 09/16/2022 06:26 AM    MCHC 32.9 09/16/2022 06:26 AM    RDW 14.0 09/16/2022 06:26 AM    LYMPHOPCT 4.1 09/16/2022 06:26 AM    MONOPCT 6.2 09/16/2022 06:26 AM    BASOPCT 0.0 09/16/2022 06:26 AM    MONOSABS 1.1 09/16/2022 06:26 AM    LYMPHSABS 0.7 09/16/2022 06:26 AM    EOSABS 0.0 09/16/2022 06:26 AM    BASOSABS 0.0 09/16/2022 06:26 AM     BMP:    Lab Results   Component Value Date/Time     09/16/2022 06:26 AM    K 3.9 09/16/2022 06:26 AM    CL 99 09/16/2022 06:26 AM    CO2 28 09/16/2022 06:26 AM    BUN 13 09/16/2022 06:26 AM    CREATININE 0.9 11/11/2022 12:27 PM    CREATININE 0.7 09/16/2022 06:26 AM    CALCIUM 8.1 09/16/2022 06:26 AM    GFRAA >60 09/16/2022 06:26 AM    LABGLOM >60 11/11/2022 12:27 PM     Lab Results   Component Value Date/Time    PROTIME 12.9 09/13/2022 09:25 AM    INR 0.99 09/13/2022 09:25 AM       Assessment:      PE/ DVT now on Xarelto / states reviewing his xarelto with his dr / ct  cardiac is neg for clot  11/14/2022  No c/o bleeding / one  fall    No NSAIDS only Tylenol     11/14/2022 CT cardiac   1. There is no evidence of coronary arterial plaque or stenosis. Findings correspond to CAD-RADS category 0.   2. Coronary calcium score is 0.   3. Codominant coronary anatomy with right coronary artery supplying the posterior descending artery and left circumflex artery supplying the left ventricular posterolateral branch. 4. Stable, partially imaged cyst in the right hepatic lobe. 5. Pulmonary emboli noted on the prior CT scan from 10/3/2022 are not visualized currently       10/3/2022 PE   Small pulmonary emboli in the periphery of the right lower lobe. No infiltrate, no evidence of right ventricular strain. 104/2022 [facility] DVT  No evidence of acute deep vein thrombosis in the right lower extremity. o The evaluation is positive for the presence of acute deep vein thrombosis        in the left lower extremity , as described above.      o Calf vein visualization is limited in the right and left lower        extremities . o Bilateral soft tissue edema noted. bradycardia after craniotomy   No c/o presyncope or palpitations  Occas lightheadness with recent brain surgery      Staples on left side of head dry & approximated   VSS denies fever  cough or CVA symptoms   c/o headaches at times and take tylenol       HTN wnl     HLD wnl LDL     Recurring brain masses surgically treated surgery 9/13/2022 9/14/2022 MRI brain   1.  Expected postoperative changes with susceptibility artifact and small extra cranial fluid collection along the craniotomy site in the left temporal region. No acute complication or significant residual mass identified. Attention to this region on    follow-up MRI in 3-6 months recommended. 2. Stable FLAIR signal abnormality and mass effect from the resected mass in the left temporal region with stable left-to-right midline shift       Obesity Body mass index is 38.24 kg/m². Has over 100# /eats better / [employer] has been on strict diet       TTE 9/15/2022   Technically difficult study due to patient body habitus. Normal left ventricle size, wall thickness, and systolic function with an estimated  ejection fraction of 55-60%. No regional wall motion abnormalities are seen. Indeterminate diastolic function. There is mild tricuspid  valve regurgitation no stenosis. No pericardial effusion noted. No pleural effusion. IVC size is normal (<2.1cm) and collapses > 50%  with respiration consistent with normal RA pressure (3mmHg). Unable to estimate pulmonary artery pressure secondary to  incomplete TR jet envelope. Plan    c/o flutter in his chest twice last night lasting seconds, no presyncope or syncope/denies c/p fever cough   b/p is wax and waning went to his PCP  today / following with neuro also   Carisa Odom  / h/a's  with recent brain surgery      c/o headaches at times and take tylenol   Zio worn NSVT and PACs  HR lowest 44 NSR 11/2022   Cor CT neg for CAD 11/2022  On xarelto for DVT / PE denies noted bleeding / feels dizzy occas discussed falls and note any bleeding / no c/o seizures      TTE / Holter for 2 weeks / blood work   Fu in 6-8 weeks   If you haven't had a visit with Dr. Elizabeth Powers in a year please make your next visit with him. ROSLYN Locke - CNP , [facility]  1/10/86849:59 PM  Interventional cardiology note    Patient was seen by me back in September when he was in hospital after having neurologic injury and also had brain surgery. You very much confused and had short runs of V. tach.   We monitored him for period of time and did not find any actionable problems. Felt that he needed to have a cardiac cath or subchondral stress test later when he improves. Today he is still having headaches and did have a hematoma at that time but neurologically is slowly improving and actually is able to walk with significant assistance. Still having episodes of headaches. From our perspective today he looks stable. The cardiac rhythm looks stable. We will plan to follow him as necessary and see him back in a couple months. He will have a ZIO monitor.   Ana Paz MD, [facility] [place]

## 2023-01-26 ENCOUNTER — HOSPITAL ENCOUNTER (OUTPATIENT)
Dept: NON INVASIVE DIAGNOSTICS | Age: 63
Discharge: HOME | End: 2023-01-26
Payer: COMMERCIAL

## 2023-01-26 DIAGNOSIS — R06.02 SOB (SHORTNESS OF BREATH): ICD-10-CM

## 2023-01-26 LAB
LV EF: 48 %
LVEF MODALITY: NORMAL

## 2023-01-26 PROCEDURE — 6360000004 HC RX CONTRAST MEDICATION: Performed by: INTERNAL MEDICINE

## 2023-01-26 PROCEDURE — C8929 TTE W OR WO FOL WCON,DOPPLER: HCPCS

## 2023-01-26 RX ADMIN — PERFLUTREN 1.5 ML: 6.52 INJECTION, SUSPENSION INTRAVENOUS at 15:45

## 2023-01-31 DIAGNOSIS — R00.2 PALPITATIONS: Primary | ICD-10-CM

## 2023-02-08 RX ORDER — METOPROLOL SUCCINATE 25 MG/1
25 TABLET, EXTENDED RELEASE ORAL DAILY
Qty: 90 TABLET | Refills: 1 | Status: SHIPPED | OUTPATIENT
Start: 2023-02-08

## 2023-03-16 DIAGNOSIS — R06.02 SOB (SHORTNESS OF BREATH): ICD-10-CM

## 2023-03-16 DIAGNOSIS — Z98.890 S/P CRANIOTOMY: ICD-10-CM

## 2023-03-16 DIAGNOSIS — D49.6 BRAIN TUMOR (HCC): ICD-10-CM

## 2023-03-16 DIAGNOSIS — R00.2 PALPITATIONS: ICD-10-CM

## 2023-03-16 LAB
DEPRECATED RDW RBC AUTO: 14.2 % (ref 12.4–15.4)
HCT VFR BLD AUTO: 42.5 % (ref 40.5–52.5)
HGB BLD-MCNC: 14.2 G/DL (ref 13.5–17.5)
MCH RBC QN AUTO: 27.7 PG (ref 26–34)
MCHC RBC AUTO-ENTMCNC: 33.4 G/DL (ref 31–36)
MCV RBC AUTO: 82.8 FL (ref 80–100)
PLATELET # BLD AUTO: 259 K/UL (ref 135–450)
PMV BLD AUTO: 7.9 FL (ref 5–10.5)
RBC # BLD AUTO: 5.14 M/UL (ref 4.2–5.9)
WBC # BLD AUTO: 6.7 K/UL (ref 4–11)

## 2023-03-17 LAB
25(OH)D3 SERPL-MCNC: 49.3 NG/ML
ALBUMIN SERPL-MCNC: 4.3 G/DL (ref 3.4–5)
ALBUMIN/GLOB SERPL: 2 {RATIO} (ref 1.1–2.2)
ALP SERPL-CCNC: 39 U/L (ref 40–129)
ALT SERPL-CCNC: 11 U/L (ref 10–40)
ANION GAP SERPL CALCULATED.3IONS-SCNC: 15 MMOL/L (ref 3–16)
AST SERPL-CCNC: 12 U/L (ref 15–37)
BILIRUB DIRECT SERPL-MCNC: <0.2 MG/DL (ref 0–0.3)
BILIRUB INDIRECT SERPL-MCNC: NORMAL MG/DL (ref 0–1)
BILIRUB SERPL-MCNC: 0.4 MG/DL (ref 0–1)
BUN SERPL-MCNC: 7 MG/DL (ref 7–20)
CALCIUM SERPL-MCNC: 8.7 MG/DL (ref 8.3–10.6)
CHLORIDE SERPL-SCNC: 103 MMOL/L (ref 99–110)
CHOLEST SERPL-MCNC: 132 MG/DL (ref 0–199)
CO2 SERPL-SCNC: 25 MMOL/L (ref 21–32)
CREAT SERPL-MCNC: 1.1 MG/DL (ref 0.8–1.3)
GFR SERPLBLD CREATININE-BSD FMLA CKD-EPI: >60 ML/MIN/{1.73_M2}
GLUCOSE SERPL-MCNC: 100 MG/DL (ref 70–99)
HDLC SERPL-MCNC: 38 MG/DL (ref 40–60)
LDLC SERPL CALC-MCNC: 69 MG/DL
MAGNESIUM SERPL-MCNC: 1.7 MG/DL (ref 1.8–2.4)
POTASSIUM SERPL-SCNC: 4 MMOL/L (ref 3.5–5.1)
PROT SERPL-MCNC: 6.5 G/DL (ref 6.4–8.2)
SODIUM SERPL-SCNC: 143 MMOL/L (ref 136–145)
TRIGL SERPL-MCNC: 125 MG/DL (ref 0–150)
TSH SERPL DL<=0.005 MIU/L-ACNC: 1.1 UIU/ML (ref 0.27–4.2)
VLDLC SERPL CALC-MCNC: 25 MG/DL

## 2023-03-22 ENCOUNTER — OFFICE VISIT (OUTPATIENT)
Dept: CARDIOLOGY CLINIC | Age: 63
End: 2023-03-22
Payer: COMMERCIAL

## 2023-03-22 VITALS
HEART RATE: 63 BPM | BODY MASS INDEX: 41.5 KG/M2 | DIASTOLIC BLOOD PRESSURE: 82 MMHG | WEIGHT: 306 LBS | SYSTOLIC BLOOD PRESSURE: 130 MMHG

## 2023-03-22 DIAGNOSIS — R07.9 CHEST PAIN AT REST: Primary | ICD-10-CM

## 2023-03-22 PROCEDURE — 93000 ELECTROCARDIOGRAM COMPLETE: CPT | Performed by: INTERNAL MEDICINE

## 2023-03-22 PROCEDURE — 99214 OFFICE O/P EST MOD 30 MIN: CPT | Performed by: INTERNAL MEDICINE

## 2023-03-22 RX ORDER — ESCITALOPRAM OXALATE 20 MG/1
20 TABLET ORAL DAILY
COMMUNITY

## 2023-03-22 RX ORDER — LAMOTRIGINE 100 MG/1
50 TABLET ORAL 2 TIMES DAILY
COMMUNITY

## 2023-03-22 NOTE — PROGRESS NOTES
daily Yes Historical Provider, MD   lamoTRIgine (LAMICTAL) 100 MG tablet Take 50 mg by mouth 2 times daily Yes Historical Provider, MD   metoprolol succinate (TOPROL XL) 25 MG extended release tablet Take 1 tablet by mouth daily Yes ROSLYN Keane CNP   rivaroxaban (XARELTO) 20 MG TABS tablet Take 20 mg by mouth Daily with supper Yes Historical Provider, MD   sennosides-docusate sodium (SENOKOT-S) 8.6-50 MG tablet Take 2 tablets by mouth daily as needed for Constipation Yes ROSLYN Miner - CNP   losartan (COZAAR) 25 MG tablet Take 25 mg by mouth daily Yes Historical Provider, MD   potassium chloride (KLOR-CON) 20 MEQ packet Take 60 mEq by mouth daily Dissolve in low sugar juice Yes Historical Provider, MD   spironolactone (ALDACTONE) 50 MG tablet Take 50 mg by mouth daily Yes Historical Provider, MD   melatonin 5 MG TABS tablet Take 5 mg by mouth nightly as needed (insomnia) Yes Historical Provider, MD   acetaminophen (TYLENOL) 500 MG tablet Take 1,000 mg by mouth daily Yes Historical Provider, MD   Azelastine-Fluticasone 137-50 MCG/ACT SUSP 1 spray by Each Nostril route in the morning and at bedtime Yes Historical Provider, MD   cetirizine (ZYRTEC) 10 MG tablet Take 10 mg by mouth daily Yes Historical Provider, MD   ergocalciferol (ERGOCALCIFEROL) 1.25 MG (43960 UT) capsule Take 50,000 Units by mouth once a week Take on Saturdays Yes Historical Provider, MD   famotidine (PEPCID) 20 MG tablet Take 20 mg by mouth 2 times daily Yes Historical Provider, MD   ferrous sulfate (IRON 325) 325 (65 Fe) MG tablet Take 325 mg by mouth daily (with breakfast) Yes Historical Provider, MD   latanoprost (XALATAN) 0.005 % ophthalmic solution Place 1 drop into both eyes nightly Yes Historical Provider, MD   sodium chloride (OCEAN) 0.65 % nasal spray 1 spray by Nasal route in the morning and at bedtime Yes Historical Provider, MD   levETIRAcetam (KEPPRA) 500 MG tablet Take 1 tablet by mouth 2 times daily for 7 days

## 2023-05-30 RX ORDER — METOPROLOL SUCCINATE 25 MG/1
TABLET, EXTENDED RELEASE ORAL
Qty: 90 TABLET | Refills: 3 | Status: SHIPPED | OUTPATIENT
Start: 2023-05-30

## 2023-09-11 NOTE — PROGRESS NOTES
Occupational Therapy  Facility/Department: [address] [place] [address] ICU  Occupational Therapy Initial Assessment /Treatment/Discharge     Name: Velia Folrian  : 1960  MRN: [id number]  Date of Service: 9/15/2022    Discharge Recommendations:   Velia Florian scored a 24/24 on the [facility] ADL Inpatient form. At this time, no further OT is recommended upon discharge. Recommend patient returns to prior setting with prior services. OT Equipment Recommendations  Equipment Needed: No       Patient Diagnosis(es): The encounter diagnosis was Brain neoplasm ([address] Past Medical History:  has a past medical history of Brain mass, [address] [address] Flashbacks [address] [address] Glaucoma, Hypertension, Hypokalemia, Kidney anomaly, congenital, Lumbar spinal stenosis, Memory deficit, Osteoarthritis, Prediabetes, and Sinus problem. Past Surgical History:  has a past surgical history that includes Colonoscopy; Tonsillectomy and adenoidectomy; sinus surgery; and craniotomy (Left, 2022). Assessment   Assessment: Pt seen s/p craniotomy. Pt demonstrated good functional independence. Pt is from home (his sister lives above him) and was independent and working. No acute OT needs identified. Pt expresses no concerns regarding discharge to home. Prognosis: Good  Decision Making: Low Complexity  REQUIRES OT FOLLOW-UP: No  Activity Tolerance  Activity Tolerance: Patient Tolerated treatment well        Plan   Plan  Plan Comment: Discharge pt from acute OT     Restrictions  Position Activity Restriction  Other position/activity restrictions: ambulate patient    Subjective   General  Chart Reviewed: Yes  Patient assessed for rehabilitation services?: Yes  Additional Pertinent Hx: Pt admitted 22 for procedure: LEFT TEMPORAL CRANIOTOMY FOR RESECTION OF TUMOR WITH IMAGE GUIDANCE (Left)  Family / Caregiver Present: No  Referring Practitioner: ROSLYN Reed-CNP  Diagnosis: Brain neoplasm  Subjective  Subjective: Pt in bed upon entry.    Pt Hepatology was consulted for elevated LFTs.   Chart reviewed.   Patient is a 76M with a PMHx of anemia, GIB, Hep C (treated, >10 yrs ago), prior ETOH use and IVDA (30 yrs ago), gout, GERD, hyperkalemia, b/l venous insufficiency, BPH, prostate CA, afib (on eliquis), COPD (not on inhalers or supplemental O2), FAIZAN (on CPAP), osteoporosis, polyarthritis, CKD stage III (s/p R AVF creation), HTN, dry eyes, duodenitis, CAD (s/p PCI), HLD, AS (s/p TAVR), VTach (s/p Medtronic ICD), and HFrEF who presented to the ED on 9/8 for shortness of breath.     On admission, labs notable for TBili 0.3, alk phos 80, AST 48H, and ALT 57.   9/9: TB0.8, alk phos 75, ,   9/10: TBili 0.6, alk phos 74, ,   9/11: TB 0.5, Alk phos 76, AST 93,   RUQUS done 9/9 showed mildly lobulated hepatic contour without evidence of a focal hepatic mass lesion, correlate for cirrhosis.   RUQUS done 5/1 showed mild hepatic steatosis, no focal hepatic lesions identified.    Per chart review, patient was evaluated by inpatient hepatology Dr. Blake (1/20/23) for elevated liver enzymes, at which time abnormal liver enzymes in hepatocellular pattern: AST 86->311->323->1202,  ALT 81->321->388->995), with normal cholestatic parameters  (ALP 72, se bi 0.4), alb 2.7.   R factor 5.1, once again indicating hepatocellular injury. Unable to calculate recent MELD given no PT/INR available, MELD from 8/27/23 labs >22. Differentials include acute viral hepatitis vs DILI vs autoimmune hepatitis vs ischemic hepatitis/shock liver vs Budd-Chiari syndrome vs Jevon disease vs malignant infiltration vs sepsis vs other. Given overall downtrending LFTs and HD stability, recommend outpatient hepatology consult with Dr. Blake for further evaluation, r/o cirrhosis.     - Avoid hepatotoxic medications  - Avoid NSAIDs if able  - Avoid herbal supplements and raw shellfish  - Daily MELD labs (LFTs, Cr, INR),   Consider nonurgent---  - acute viral hepatitis panel including HAV total antibody, HBsAb, HBcAb, IgG/IgM  - iron panel including ferritin  - antismooth muscle antibody, anti-mitochondrial antibody, anti-nuclear antibody, anti-LKM  - alpha-1 antitrypsin and ceruloplasmin  - RUQUS with doppler to r/o thrombus  - Close outpatient hepatology follow up with Dr. Blake for further workup agreeable to activity. Pt very talkative. Social/Functional History  Social/Functional History  Lives With: Alone  Type of Home: House (two family home, sister lives upstairs)  Home Layout: Able to Live on Main level with bedroom/bathroom  Home Access: Stairs to enter without rails  Entrance Stairs - Number of Steps: 1  Bathroom Shower/Tub: Tub/Shower unit (2 steps up to bathroom)  Bathroom Toilet: Standard  Bathroom Equipment: Grab bars in shower (sink beside toilet)  Home Equipment: Walker, rolling, Long-handled shoehorn, Sock aid, Grab bars, Reacher (long handled sponge)  Has the patient had two or more falls in the past year or any fall with injury in the past year?: No  ADL Assistance: Independent  Homemaking Assistance: Independent  Ambulation Assistance: Independent  Transfer Assistance: Independent  Active : Yes  Occupation: On disability  Type of Occupation: [employer] [facility] with 7yo nephew, video games, walking on treadmill, watching movies       Objective      Observation/Palpation  Edema: BLE edema noted, RN aware. Pt reports this is due to not receiving his medication yet. Safety Devices  Type of Devices: Left in chair;Call light within reach; Chair alarm in place;Nurse notified  Balance  Sitting: Intact  Standing: Intact  Toilet Transfers  Toilet - Technique: Ambulating  Equipment Used: Standard toilet  Toilet Transfer: Independent  AROM: Within functional limits  Strength: Within functional limits  Coordination: Within functional limits  ADL  Grooming: Independent  LE Dressing: Independent  Toileting:  (denied need)     Activity Tolerance  Activity Tolerance: Patient tolerated evaluation without incident  Bed mobility  Supine to Sit: Independent  Scooting: Independent  Bed Mobility Comments: HOB slightly elevated. Transfers  Stand Step Transfers: Independent  Sit to stand: Independent  Stand to sit:  Independent  Vision  Vision: Impaired  Vision Exceptions: Wears glasses at all times  Hearing  Hearing: Within functional limits  Cognition  Overall Cognitive Status: WNL  Orientation  Overall Orientation Status: Within Normal Limits  Orientation Level: Oriented X4                  Education Given To: Patient  Education Provided: Role of Therapy  Education Method: Verbal  Barriers to Learning: None  Education Outcome: Verbalized understanding           Hand Dominance  Hand Dominance: Right     Treatment included ADL and transfer training.      AM-PAC Score        AM-PAC Inpatient Daily Activity Raw Score: 24 (09/15/22 0949)  AM-PAC Inpatient ADL T-Scale Score : 57.54 (09/15/22 0949)  ADL Inpatient CMS 0-100% Score: 0 (09/15/22 0949)  ADL Inpatient CMS G-Code Modifier : [facility] (09/15/22 6301)    Goals   No goals indicated       Therapy Time   Individual Concurrent Group Co-treatment   Time In 0820         Time Out 0900         Minutes 40         Timed Code Treatment Minutes: 30 Minutes   Total Treatment Time:40    PUNEET Romero/NEGIN [id number]

## 2023-10-16 ENCOUNTER — OFFICE VISIT (OUTPATIENT)
Dept: CARDIOLOGY CLINIC | Age: 63
End: 2023-10-16
Payer: COMMERCIAL

## 2023-10-16 VITALS
HEART RATE: 78 BPM | DIASTOLIC BLOOD PRESSURE: 72 MMHG | SYSTOLIC BLOOD PRESSURE: 130 MMHG | HEIGHT: 72 IN | WEIGHT: 312 LBS | BODY MASS INDEX: 42.26 KG/M2

## 2023-10-16 DIAGNOSIS — R00.2 PALPITATIONS: ICD-10-CM

## 2023-10-16 DIAGNOSIS — D49.6 BRAIN TUMOR (HCC): ICD-10-CM

## 2023-10-16 DIAGNOSIS — E78.5 HYPERLIPIDEMIA LDL GOAL <70: Primary | ICD-10-CM

## 2023-10-16 PROCEDURE — 99214 OFFICE O/P EST MOD 30 MIN: CPT | Performed by: INTERNAL MEDICINE

## 2023-10-16 RX ORDER — METHOCARBAMOL 750 MG/1
1 TABLET, FILM COATED ORAL 2 TIMES DAILY
COMMUNITY
Start: 2023-09-19

## 2023-10-16 RX ORDER — ROSUVASTATIN CALCIUM 10 MG/1
TABLET, COATED ORAL
COMMUNITY
Start: 2023-07-28

## 2023-10-16 NOTE — PROGRESS NOTES
ophthalmic solution Place 1 drop into both eyes nightly      sodium chloride (OCEAN) 0.65 % nasal spray 1 spray by Nasal route in the morning and at bedtime      rosuvastatin (CRESTOR) 10 MG tablet TAKE 1 TABLET BY MOUTH EVERY DAY FOR CHOLESTEROL      levETIRAcetam (KEPPRA) 500 MG tablet Take 1 tablet by mouth 2 times daily for 7 days 14 tablet 0    spironolactone (ALDACTONE) 50 MG tablet Take 50 mg by mouth daily       No current facility-administered medications for this visit. REVIEW OF SYSTEMS:    CONSTITUTIONAL: No major weight gain or loss, fatigue, weakness, night sweats or fever. HEENT: No new vision difficulties or ringing in the ears. RESPIRATORY: No new SOB, PND, orthopnea or cough. CARDIOVASCULAR: See HPI  GI: No nausea, vomiting, diarrhea, constipation, abdominal pain or changes in bowel habits. : No urinary frequency, urgency, incontinence hematuria or dysuria. SKIN: No cyanosis or skin lesions. MUSCULOSKELETAL: No new muscle or joint pain. NEUROLOGICAL: No syncope or TIA-like symptoms. PSYCHIATRIC: No anxiety, pain, insomnia or depression    Objective:   PHYSICAL EXAM:        VITALS:    Wt Readings from Last 3 Encounters:   10/16/23 (!) 312 lb (141.5 kg)   03/22/23 (!) 306 lb (138.8 kg)   01/10/23 296 lb (134.3 kg)     BP Readings from Last 3 Encounters:   10/16/23 130/72   03/22/23 130/82   01/10/23 94/74     Pulse Readings from Last 3 Encounters:   10/16/23 78   03/22/23 63   01/10/23 72       CONSTITUTIONAL: Cooperative, no apparent distress, and appears well nourished / developed  NEUROLOGIC:  Awake and orientated to person, place and time. PSYCH: Calm affect. SKIN: Warm and dry. HEENT: Sclera non-icteric, normocephalic, neck supple, no elevation of JVP, normal carotid pulses with no bruits and thyroid normal size.   LUNGS:  No increased work of breathing and clear to auscultation, no crackles or wheezing  CARDIOVASCULAR:  Regular rate and rhythm with no murmurs, gallops, rubs,

## 2024-04-22 ENCOUNTER — OFFICE VISIT (OUTPATIENT)
Dept: CARDIOLOGY CLINIC | Age: 64
End: 2024-04-22
Payer: COMMERCIAL

## 2024-04-22 VITALS
SYSTOLIC BLOOD PRESSURE: 114 MMHG | HEART RATE: 67 BPM | DIASTOLIC BLOOD PRESSURE: 80 MMHG | WEIGHT: 315 LBS | BODY MASS INDEX: 47.47 KG/M2

## 2024-04-22 DIAGNOSIS — I10 BENIGN ESSENTIAL HYPERTENSION: Primary | ICD-10-CM

## 2024-04-22 PROCEDURE — 3079F DIAST BP 80-89 MM HG: CPT | Performed by: INTERNAL MEDICINE

## 2024-04-22 PROCEDURE — 99214 OFFICE O/P EST MOD 30 MIN: CPT | Performed by: INTERNAL MEDICINE

## 2024-04-22 PROCEDURE — 93000 ELECTROCARDIOGRAM COMPLETE: CPT | Performed by: INTERNAL MEDICINE

## 2024-04-22 PROCEDURE — 3074F SYST BP LT 130 MM HG: CPT | Performed by: INTERNAL MEDICINE

## 2024-04-22 RX ORDER — LANOLIN ALCOHOL/MO/W.PET/CERES
1000 CREAM (GRAM) TOPICAL DAILY
COMMUNITY

## 2024-04-22 NOTE — PROGRESS NOTES
Respiratory Complaint HPI





- HPI Summary


HPI Summary: 





Pt c/o cough, chest congestion and generalized malaise.  





- History of Current Complaint


Chief Complaint: UCRespiratory


Stated Complaint: COUGH,CONGESTION


Time Seen by Provider: 02/12/20 17:27


Hx Obtained From: Patient


Onset/Duration: Gradual Onset, Lasting Days, Still Present, Worse Since - onset


Timing: Constant


Severity Initially: Mild


Severity Currently: Mild


Pain Intensity: 0


Character: Cough: Productive


Aggravating Factors: Exertion, Deep Breaths, Recumbent Position


Alleviating Factors: Nothing


Associated Signs And Symptoms: Positive: Wheezing, URI, Nasal Congestion





- Risk Factors


Pulmonary Embolism Risk Factors: Recent Travel


Cardiac Risk Factors: Negative


Pseudomonas Risk Factors: Negative


Tuberculosis Risk Factors: Negative





- Allergies/Home Medications


Allergies/Adverse Reactions: 


 Allergies











Allergy/AdvReac Type Severity Reaction Status Date / Time


 


No Known Allergies Allergy   Verified 02/12/20 17:22











Home Medications: 


 Home Medications





Terazosin CAP* [Hytrin CAP 5 MG*] 1 dose PO SEE INSTRUCTIONS 02/12/20 [History 

Confirmed 02/12/20]











PMH/Surg Hx/FS Hx/Imm Hx


Previously Healthy: Yes





- Surgical History


Surgical History: Yes


Surgery Procedure, Year, and Place: DETATCHED RETINA WITH SCLERAL BUCKLE  (

RETINA VITRIOUS SPECIALISTIS IN [place] DR ZELAYA//OR REPORT SCANNED INTO 

[employer]-OTHER FACILITIES REPORTS),.  LUMBAR (NO HARDWARE);





- Family History


Known Family History: Positive: Cardiac Disease





- Social History


Occupation: Retired


Lives: With Family


Alcohol Use: Daily


Alcohol Amount: beer daily


Substance Use Type: None


Smoking Status (MU): Former Smoker


Type: Cigarettes


Amount Used/How Often: can not recall


Length of Time of Smoking/Using Tobacco: can not recall


Have You Smoked in the Last Year: No





- Immunization History


Most Recent Tetanus Shot: UTD


Vaccination Up to Date: Yes





Review of Systems


All Other Systems Reviewed And Are Negative: Yes


Constitutional: Positive: Chills, Fatigue


Skin: Positive: Negative


Eyes: Positive: Negative


Respiratory: Positive: Cough, Other - wheezing, chest congestion


Cardiovascular: Positive: Negative


Gastrointestinal: Positive: Negative


Genitourinary: Positive: Negative


Motor: Positive: Negative


Neurovascular: Positive: Negative


Musculoskeletal: Positive: Negative


Neurological/Mental Status: Positive: Negative


Psychological: Positive: Negative


Is Patient Immunocompromised?: No





Physical Exam


Triage Information Reviewed: Yes


Appearance: Well-Appearing


Vital Signs: 


 Initial Vital Signs











Temp  98.1 F   02/12/20 17:20


 


Pulse  72   02/12/20 17:20


 


Resp  16   02/12/20 17:20


 


BP  136/57   02/12/20 17:20


 


Pulse Ox  100   02/12/20 17:20











Vital Signs Reviewed: Yes


Eye Exam: Other


Eyes: Positive: Other: - cerumen bilateral ear canals.


ENT: Positive: Nasal congestion


Dental Exam: Normal


Neck exam: Normal


Respiratory: Positive: Normal breath sounds, No respiratory distress


Cardiovascular Exam: Normal


Musculoskeletal Exam: Normal


Neurological Exam: Normal


Psychological Exam: Normal


Skin Exam: Normal





Respiratory Course/Dx





- Course


Course Of Treatment: 





Pt requested to have bilateral ears irrigated





- Differential Dx/Diagnosis


Differential Diagnosis/HQI/PQRI: Bronchitis, Influenza


Provider Diagnosis: 


 Upper respiratory infection








Discharge ED





- Sign-Out/Discharge


Documenting (check all that apply): Patient Departure


All imaging exams completed and their final reports reviewed: No Studies





- Discharge Plan


Condition: Stable


Disposition: HOME


Prescriptions: 


Benzonatate CAP* [Tessalon 100 MG CAP*] 100 - 200 mg PO Q8H PRN #30 cap


 PRN Reason: Cough


predniSONE 10 mg TAB [Deltasone 10 MG TAB*] 30 mg PO DAILY #12 tab


Patient Education Materials:  Cerumen Impaction (ED), Upper Respiratory 

Infection (ED)


Referrals: 


Bobby Smith MD [Primary Care Provider] - If Needed





- Billing Disposition and Condition


Condition: STABLE


Disposition: Home major weight gain or loss, fatigue, weakness, night sweats or fever.  HEENT: No new vision difficulties or ringing in the ears.  RESPIRATORY: No new SOB, PND, orthopnea or cough.   CARDIOVASCULAR: See HPI  GI: No nausea, vomiting, diarrhea, constipation, abdominal pain or changes in bowel habits.  : No urinary frequency, urgency, incontinence hematuria or dysuria.  SKIN: No cyanosis or skin lesions.  MUSCULOSKELETAL: No new muscle or joint pain.  NEUROLOGICAL: No syncope or TIA-like symptoms.  PSYCHIATRIC: No anxiety, pain, insomnia or depression    Objective:   PHYSICAL EXAM:        VITALS:    Wt Readings from Last 3 Encounters:   04/22/24 (!) 158.8 kg (350 lb)   10/16/23 (!) 141.5 kg (312 lb)   03/22/23 (!) 138.8 kg (306 lb)     BP Readings from Last 3 Encounters:   04/22/24 114/80   10/16/23 130/72   03/22/23 130/82     Pulse Readings from Last 3 Encounters:   04/22/24 67   10/16/23 78   03/22/23 63       CONSTITUTIONAL: Cooperative, no apparent distress, and appears well nourished / developed  NEUROLOGIC:  Awake and orientated to person, place and time.  PSYCH: Calm affect.  SKIN: Warm and dry.  HEENT: Sclera non-icteric, normocephalic, neck supple, no elevation of JVP, normal carotid pulses with no bruits and thyroid normal size.  LUNGS:  No increased work of breathing and clear to auscultation, no crackles or wheezing  CARDIOVASCULAR:  Regular rate and rhythm with no murmurs, gallops, rubs, or abnormal heart sounds, normal PMI.The apical impulses not displaced  Heart tones are crisp and normal  Cervical veins are not engorged  The carotid upstroke is normal in amplitude and contour without delay or bruit  JVP is not elevated  ABDOMEN:  Normal bowel sounds, non-distended and non-tender to palpation  EXT: No edema, no calf tenderness. Pulses are present bilaterally.    DATA:    Lab Results   Component Value Date    ALT 11 03/16/2023    AST 12 (L) 03/16/2023    ALKPHOS 39 (L) 03/16/2023    BILITOT 0.4

## 2024-05-29 RX ORDER — METOPROLOL SUCCINATE 25 MG/1
TABLET, EXTENDED RELEASE ORAL
Qty: 30 TABLET | Refills: 5 | Status: SHIPPED | OUTPATIENT
Start: 2024-05-29

## 2024-08-26 ENCOUNTER — OFFICE VISIT (OUTPATIENT)
Dept: CARDIOLOGY CLINIC | Age: 64
End: 2024-08-26
Payer: COMMERCIAL

## 2024-08-26 VITALS
SYSTOLIC BLOOD PRESSURE: 98 MMHG | HEART RATE: 60 BPM | DIASTOLIC BLOOD PRESSURE: 74 MMHG | WEIGHT: 315 LBS | BODY MASS INDEX: 46.79 KG/M2

## 2024-08-26 DIAGNOSIS — R06.02 SOB (SHORTNESS OF BREATH): Primary | ICD-10-CM

## 2024-08-26 DIAGNOSIS — R07.9 CHEST PAIN, UNSPECIFIED TYPE: ICD-10-CM

## 2024-08-26 PROCEDURE — 99244 OFF/OP CNSLTJ NEW/EST MOD 40: CPT | Performed by: INTERNAL MEDICINE

## 2024-08-26 RX ORDER — ACETAMINOPHEN 160 MG
1000 TABLET,DISINTEGRATING ORAL
COMMUNITY

## 2024-08-26 RX ORDER — PRENATAL VIT 91/IRON/FOLIC/DHA 28-975-200
COMBINATION PACKAGE (EA) ORAL 2 TIMES DAILY
COMMUNITY

## 2024-08-26 NOTE — PROGRESS NOTES
[facility] [place]   Dr Brian Jaffe MD, [facility]    Outpatient Follow Up Note    8/26/2024,10:24 AM  Subjective:   CHIEF COMPLAINT / HPI:  Follow Up secondary to tachycardia-bradycardia     El Zelaya is 64 y.o. male who presents today 8/26/2024 for follow-up.  Still heavy but has lost about 5 pounds to 345.  Complains of having shortness of breath and dyspnea on exertion.  LDL was 69.  He did have previous evaluation and was unremarkable.  I think he needs to have a coronary CTA.    Taken off Xarelto a year and a half ago.         Hypertension  Hyperlipidemia  Recurring brain masses surgically treated 9/11/23/meningiomas  CMP 40-45%       Past Medical History:    Past Medical History:   Diagnosis Date    Brain mass     left temporal measuring 4.2 cm, meningioma    Cancer (HCC)     Flashbacks (HCC)     Glaucoma     Hypertension     Hypokalemia     Kidney anomaly, congenital     congenital solitary kidney    Lumbar spinal stenosis     Memory deficit     Osteoarthritis     bilat knees    Prediabetes     Sinus problem     chronic sinusitis     Past Surgical History  Past Surgical History:   Procedure Laterality Date    COLONOSCOPY      2020 x2    CRANIOTOMY Left 9/13/2022    LEFT TEMPORAL CRANIOTOMY FOR RESECTION OF TUMOR WITH IMAGE GUIDANCE performed by Pb George MD at [facility] OR    SINUS SURGERY      cyst removed    TONSILLECTOMY AND ADENOIDECTOMY      childhood     Social History:       Social History     Tobacco Use   Smoking Status Never   Smokeless Tobacco Never     Current Medications:  Prior to Visit Medications    Medication Sig Taking? Authorizing Provider   Cholecalciferol (VITAMIN D3) 50 MCG (2000 UT) CAPS Take 1,000 capsules by mouth Yes ProviderEloisa MD   terbinafine (LAMISIL) 1 % cream Apply topically 2 times daily Apply topically 2 times daily. Yes ProviderEloisa MD   metoprolol succinate (TOPROL XL) 25 MG extended release tablet TAKE 1 TABLET BY

## 2024-09-03 ENCOUNTER — HOSPITAL ENCOUNTER (OUTPATIENT)
Dept: CT IMAGING | Age: 64
Discharge: HOME | End: 2024-09-03
Attending: INTERNAL MEDICINE
Payer: COMMERCIAL

## 2024-09-03 VITALS — HEART RATE: 63 BPM | SYSTOLIC BLOOD PRESSURE: 120 MMHG | DIASTOLIC BLOOD PRESSURE: 73 MMHG

## 2024-09-03 DIAGNOSIS — R06.02 SOB (SHORTNESS OF BREATH): ICD-10-CM

## 2024-09-03 DIAGNOSIS — R07.9 CHEST PAIN, UNSPECIFIED TYPE: ICD-10-CM

## 2024-09-03 PROCEDURE — 6370000000 HC RX 637 (ALT 250 FOR IP): Performed by: STUDENT IN AN ORGANIZED HEALTH CARE EDUCATION/TRAINING PROGRAM

## 2024-09-03 PROCEDURE — 6360000004 HC RX CONTRAST MEDICATION: Performed by: INTERNAL MEDICINE

## 2024-09-03 PROCEDURE — 75574 CT ANGIO HRT W/3D IMAGE: CPT

## 2024-09-03 RX ORDER — IOPAMIDOL 755 MG/ML
100 INJECTION, SOLUTION INTRAVASCULAR
Status: COMPLETED | OUTPATIENT
Start: 2024-09-03 | End: 2024-09-03

## 2024-09-03 RX ORDER — NITROGLYCERIN 0.4 MG/1
0.8 TABLET SUBLINGUAL
Status: COMPLETED | OUTPATIENT
Start: 2024-09-03 | End: 2024-09-03

## 2024-09-03 RX ORDER — SODIUM CHLORIDE 0.9 % (FLUSH) 0.9 %
5-40 SYRINGE (ML) INJECTION PRN
Status: DISCONTINUED | OUTPATIENT
Start: 2024-09-03 | End: 2024-09-04 | Stop reason: HOSPADM

## 2024-09-03 RX ORDER — METOPROLOL TARTRATE 1 MG/ML
5 INJECTION, SOLUTION INTRAVENOUS EVERY 5 MIN PRN
Status: DISCONTINUED | OUTPATIENT
Start: 2024-09-03 | End: 2024-09-04 | Stop reason: HOSPADM

## 2024-09-03 RX ORDER — SODIUM CHLORIDE 9 MG/ML
INJECTION, SOLUTION INTRAVENOUS PRN
Status: DISCONTINUED | OUTPATIENT
Start: 2024-09-03 | End: 2024-09-04 | Stop reason: HOSPADM

## 2024-09-03 RX ORDER — NITROGLYCERIN 0.4 MG/1
0.4 TABLET SUBLINGUAL
Status: COMPLETED | OUTPATIENT
Start: 2024-09-03 | End: 2024-09-03

## 2024-09-03 RX ORDER — SODIUM CHLORIDE 0.9 % (FLUSH) 0.9 %
5-40 SYRINGE (ML) INJECTION EVERY 12 HOURS SCHEDULED
Status: DISCONTINUED | OUTPATIENT
Start: 2024-09-03 | End: 2024-09-04 | Stop reason: HOSPADM

## 2024-09-03 RX ADMIN — NITROGLYCERIN 0.8 MG: 0.4 TABLET, ORALLY DISINTEGRATING SUBLINGUAL at 12:27

## 2024-09-03 RX ADMIN — IOPAMIDOL 100 ML: 755 INJECTION, SOLUTION INTRAVENOUS at 12:28

## 2024-09-03 NOTE — DISCHARGE INSTRUCTIONS
Thank You for choosing [facility] for your medical care.    The dose of the medication you were given was 2 sublingual nitroglycerin.    Although there are few side effects from this medication when given in small amounts (doses) it is important you follow a few important instructions:    Notify the doctor that ordered your test or go to the nearest emergency room if you experience any of the following:  difficulty breathing  dizziness  extreme fatigue  pounding or irregular heart beat    Continue your usual diet, increase fluids today.  (Four 8 ounce glasses of water).                    4.You may return back to your normal activity and routine tomorrow.                Test results will be sent to your Physician.    If you take Actoplus Met, Avandamet, Glucophage, Glucophage XR, Glucovance, Metformin, Metaglip, Riomet, or Fortmet hold medication for 48 hours after procedure and resum

## 2024-11-19 RX ORDER — METOPROLOL SUCCINATE 25 MG/1
TABLET, EXTENDED RELEASE ORAL
Qty: 30 TABLET | Refills: 10 | Status: SHIPPED | OUTPATIENT
Start: 2024-11-19

## 2025-06-26 NOTE — PATIENT INSTRUCTIONS
Thank you for choosing [facility] for your cardiac care.    During your visit today, we reviewed and confirmed your cardiac medications along with  medication prescribed by your other healthcare team members. Please be sure to discuss any  changes to medication with your providers.    Please bring a list of ALL medications (or the bottles) with you to EVERY appointment.  Also include vitamins and over-the-counter medications.    If you need refills for any cardiac medications, please call your pharmacy and they will reach out to us electronically.    Did your provider order testing today? If yes, then you will receive your results in three  possible ways. You can receive a [employer] message, a phone call, or letter in the mail. Please  note, if you are an active [employer] user, some of your testing will be available within 1-2 days.    Finally, please know that it is good for your heart to exercise and follow a healthy, low-fat diet  as advised by your physician and health care providers.    If you are experiencing a medical emergency, please call 911 immediately.    It's easy to register for a [employer] account if you don't already have one. With a [employer]  account you can manage your health record, view test results, schedule appointments and  more.     Dr. Spears's clinical staff can be reached at the following phone number: [phone]    If any cardiac testing is ordered, please contact central scheduling at [phone] to get your test scheduled.      No more than 64 oz of fluids daily ( no more than 8 cups of liquids)  Get lab work

## 2025-06-26 NOTE — PROGRESS NOTES
[facility]     Outpatient Cardiology         Patient Name:  El Zelaya  Primary Care Physician: Harshad Hobbs,   06/27/25     Assessment & Plan    Assessment / Plan:     Shortness of breath and leg swelling-likely secondary to chronic diastolic heart failure.  May have an element of chronic cor pulmonale as well.  Currently symptoms have improved.  Will check labs.  Patient on Cozaar, Aldactone and potassium supplement.  Recommend checking potassium level.  Will check BNP.  If BNP significantly elevated, will need an echocardiogram.  Can consider Jardiance on follow-up based on labs.    Morbid obesity-patient denies symptoms of sleep apnea.    Discussed salt and fluid restriction.    Essential hypertension-well-controlled.  Continue Cozaar, Toprol and Aldactone    Hyperlipidemia-continue Crestor    Follow-up as scheduled              Chief Complaint:     Chief Complaint   Patient presents with    Follow-up    Shortness of Breath       History of Present Illness:       HPI     El Zelaya is a 64 y.o. male with PMH of HTN and HLD here with the complaints of shortness of breath.  Previously seen by Dr Jaffe.      Today he reports he gets short of breath on exertion. He does have bilateral lower leg edema.  Symptoms improved after keeping leg elevated.  Patient was concerned about his cardiac status and comes in for further evaluation.  States that he has postnasal drip.  Denies symptoms of sleep apnea.  Patient denies any chest pain,  palpitations, presyncope or syncope. No TIA. No claudication. No recent hospitalizations    PMH  Past Medical History:   Diagnosis Date    Brain mass     left temporal measuring 4.2 cm, meningioma    Cancer (HCC)     Flashbacks (HCC)     Glaucoma     Hypertension     Hypokalemia     Kidney anomaly, congenital     congenital solitary kidney    Lumbar spinal stenosis     Memory deficit     Osteoarthritis     bilat knees    Prediabetes

## 2025-06-27 ENCOUNTER — OFFICE VISIT (OUTPATIENT)
Dept: CARDIOLOGY CLINIC | Age: 65
End: 2025-06-27

## 2025-06-27 VITALS
DIASTOLIC BLOOD PRESSURE: 60 MMHG | WEIGHT: 315 LBS | OXYGEN SATURATION: 96 % | BODY MASS INDEX: 47.2 KG/M2 | HEART RATE: 93 BPM | SYSTOLIC BLOOD PRESSURE: 100 MMHG

## 2025-06-27 DIAGNOSIS — R06.02 SOB (SHORTNESS OF BREATH): Primary | ICD-10-CM

## 2025-06-27 DIAGNOSIS — R06.02 SOB (SHORTNESS OF BREATH): ICD-10-CM

## 2025-06-27 DIAGNOSIS — I10 BENIGN ESSENTIAL HYPERTENSION: ICD-10-CM

## 2025-06-27 DIAGNOSIS — R07.9 CHEST PAIN, UNSPECIFIED TYPE: ICD-10-CM

## 2025-06-27 DIAGNOSIS — E78.5 HYPERLIPIDEMIA LDL GOAL <70: ICD-10-CM

## 2025-06-27 LAB
ALBUMIN SERPL-MCNC: 4.4 G/DL (ref 3.4–5)
ALBUMIN/GLOB SERPL: 2.1 {RATIO} (ref 1.1–2.2)
ALP SERPL-CCNC: 48 U/L (ref 40–129)
ALT SERPL-CCNC: 12 U/L (ref 10–40)
ANION GAP SERPL CALCULATED.3IONS-SCNC: 11 MMOL/L (ref 3–16)
AST SERPL-CCNC: 21 U/L (ref 15–37)
BILIRUB SERPL-MCNC: 0.6 MG/DL (ref 0–1)
BUN SERPL-MCNC: 9 MG/DL (ref 7–20)
CALCIUM SERPL-MCNC: 9 MG/DL (ref 8.3–10.6)
CHLORIDE SERPL-SCNC: 103 MMOL/L (ref 99–110)
CHOLEST SERPL-MCNC: 89 MG/DL (ref 0–199)
CO2 SERPL-SCNC: 25 MMOL/L (ref 21–32)
CREAT SERPL-MCNC: 1.2 MG/DL (ref 0.8–1.3)
DEPRECATED RDW RBC AUTO: 13.8 % (ref 12.4–15.4)
GFR SERPLBLD CREATININE-BSD FMLA CKD-EPI: 68 ML/MIN/{1.73_M2}
GLUCOSE SERPL-MCNC: 86 MG/DL (ref 70–99)
HCT VFR BLD AUTO: 42.3 % (ref 40.5–52.5)
HDLC SERPL-MCNC: 36 MG/DL (ref 40–60)
HGB BLD-MCNC: 14.2 G/DL (ref 13.5–17.5)
LDLC SERPL CALC-MCNC: 34 MG/DL
MCH RBC QN AUTO: 28.6 PG (ref 26–34)
MCHC RBC AUTO-ENTMCNC: 33.7 G/DL (ref 31–36)
MCV RBC AUTO: 84.8 FL (ref 80–100)
NT-PROBNP SERPL-MCNC: 256 PG/ML (ref 0–124)
PLATELET # BLD AUTO: 203 K/UL (ref 135–450)
PMV BLD AUTO: 8.1 FL (ref 5–10.5)
POTASSIUM SERPL-SCNC: 4.2 MMOL/L (ref 3.5–5.1)
PROT SERPL-MCNC: 6.5 G/DL (ref 6.4–8.2)
RBC # BLD AUTO: 4.99 M/UL (ref 4.2–5.9)
SODIUM SERPL-SCNC: 139 MMOL/L (ref 136–145)
TRIGL SERPL-MCNC: 97 MG/DL (ref 0–150)
TSH SERPL DL<=0.005 MIU/L-ACNC: 1.87 UIU/ML (ref 0.27–4.2)
VLDLC SERPL CALC-MCNC: 19 MG/DL
WBC # BLD AUTO: 8.8 K/UL (ref 4–11)

## 2025-06-27 PROCEDURE — 93000 ELECTROCARDIOGRAM COMPLETE: CPT | Performed by: INTERNAL MEDICINE

## 2025-06-27 PROCEDURE — 3078F DIAST BP <80 MM HG: CPT | Performed by: INTERNAL MEDICINE

## 2025-06-27 PROCEDURE — 3074F SYST BP LT 130 MM HG: CPT | Performed by: INTERNAL MEDICINE

## 2025-06-27 PROCEDURE — 99214 OFFICE O/P EST MOD 30 MIN: CPT | Performed by: INTERNAL MEDICINE

## 2025-06-27 RX ORDER — AZELASTINE HYDROCHLORIDE 137 UG/1
SPRAY, METERED NASAL
COMMUNITY
Start: 2025-06-05

## 2025-06-30 ENCOUNTER — RESULTS FOLLOW-UP (OUTPATIENT)
Dept: CARDIOLOGY | Age: 65
End: 2025-06-30

## (undated) DEVICE — SOLUTION IV 500ML 0.9% SOD CHL PH 5 INJ USP VIAFLX PLAS

## (undated) DEVICE — CRANI: Brand: MEDLINE INDUSTRIES, INC.

## (undated) DEVICE — BLADE CLIPPER SURG SENSICLIP

## (undated) DEVICE — HOOK RETRCT 12MM S STL BLNT E STAY LONE STAR

## (undated) DEVICE — TOOL 10BA50-MN LEGEND 10CM 5MM BA: Brand: MIDAS REX™

## (undated) DEVICE — DRESSING GERM DIA1IN CNTR H DIA7MM BLU CHG ANTIMIC PROTCT

## (undated) DEVICE — SOLUTION IV 1000ML 0.9% SOD CHL

## (undated) DEVICE — ADAPTER LAB INTMED LUER 17GA

## (undated) DEVICE — CODMAN® RANEY SCALP CLIPS 20 UNITS OF 10 CLIPS: Brand: CODMAN®

## (undated) DEVICE — SUTURE NRLN SZ 4-0 L18IN NONABSORBABLE BLK L13MM TF 1/2 CIR C584D

## (undated) DEVICE — TOOL F2/8TA23 LEGEND 8CM 2.3MM TAPER: Brand: MIDAS REX™

## (undated) DEVICE — SEALANT SURG 13 YR DURA AUTOSPRAY ADHERUS NUS106] SURGICAL ONE]

## (undated) DEVICE — CODMAN® SURGICAL PATTIES 3/4" X 3/4" (1.91CM X 1.91CM): Brand: CODMAN®

## (undated) DEVICE — COTTON BALLS, DOUBLE STRUNG: Brand: DEROYAL

## (undated) DEVICE — SPONGE,LAP,18"X18",DLX,XR,ST,5/PK,40/PK: Brand: MEDLINE

## (undated) DEVICE — CODMAN® SURGICAL PATTIES 1/4" X 1/4" (0.64CM X 0.64CM): Brand: CODMAN®

## (undated) DEVICE — 3L THIN WALL CAN: Brand: CRD

## (undated) DEVICE — AGENT HEMSTAT W2XL4IN OXIDIZED REGENERATED CELOS ABSRB SFT

## (undated) DEVICE — SUTURE VCRL SZ 2-0 L18IN ABSRB UD CT-1 L36MM 1/2 CIR J839D

## (undated) DEVICE — COVER LT HNDL CAM BLU DISP W/ SURG CTRL

## (undated) DEVICE — TOWEL,STOP FLAG GOLD N-W: Brand: MEDLINE

## (undated) DEVICE — SPONGE,NEURO,1"X3",XR,STRL,LF,10/PK: Brand: MEDLINE

## (undated) DEVICE — 3M™ TEGADERM™ TRANSPARENT FILM DRESSING FRAME STYLE, 1624W, 2-3/8 IN X 2-3/4 IN (6 CM X 7 CM), 100/CT 4CT/CASE: Brand: 3M™ TEGADERM™

## (undated) DEVICE — GLOVE ORTHO 8   MSG9480

## (undated) DEVICE — NEURO SPONGES: Brand: DEROYAL